# Patient Record
Sex: FEMALE | Race: WHITE | Employment: OTHER | URBAN - METROPOLITAN AREA
[De-identification: names, ages, dates, MRNs, and addresses within clinical notes are randomized per-mention and may not be internally consistent; named-entity substitution may affect disease eponyms.]

---

## 2017-05-12 ENCOUNTER — HOSPITAL ENCOUNTER (OUTPATIENT)
Age: 82
Setting detail: SPECIMEN
Discharge: HOME | End: 2017-05-12
Payer: COMMERCIAL

## 2017-05-12 LAB
T3 FREE: 2.47 PG/ML (ref 2.02–4.43)
THYROXINE, FREE: 1.45 NG/DL (ref 0.93–1.7)
TSH SERPL DL<=0.05 MIU/L-ACNC: 0.03 MIU/L (ref 0.3–5)

## 2017-05-12 PROCEDURE — 36415 COLL VENOUS BLD VENIPUNCTURE: CPT

## 2017-05-12 PROCEDURE — 84439 ASSAY OF FREE THYROXINE: CPT

## 2017-05-12 PROCEDURE — P9603 ONE-WAY ALLOW PRORATED MILES: HCPCS

## 2017-05-12 PROCEDURE — 84443 ASSAY THYROID STIM HORMONE: CPT

## 2017-05-12 PROCEDURE — 84481 FREE ASSAY (FT-3): CPT

## 2017-07-11 ENCOUNTER — HOSPITAL ENCOUNTER (OUTPATIENT)
Age: 82
Setting detail: SPECIMEN
Discharge: HOME | End: 2017-07-11
Payer: COMMERCIAL

## 2017-07-11 LAB
ANION GAP SERPL CALCULATED.3IONS-SCNC: 10 MMOL/L (ref 9–17)
BUN BLDV-MCNC: 9 MG/DL (ref 8–23)
BUN/CREAT BLD: ABNORMAL (ref 9–20)
CALCIUM SERPL-MCNC: 8.7 MG/DL (ref 8.6–10.4)
CHLORIDE BLD-SCNC: 102 MMOL/L (ref 98–107)
CO2: 30 MMOL/L (ref 20–31)
CREAT SERPL-MCNC: 0.5 MG/DL (ref 0.5–0.9)
GFR AFRICAN AMERICAN: >60 ML/MIN
GFR NON-AFRICAN AMERICAN: >60 ML/MIN
GFR SERPL CREATININE-BSD FRML MDRD: ABNORMAL ML/MIN/{1.73_M2}
GFR SERPL CREATININE-BSD FRML MDRD: ABNORMAL ML/MIN/{1.73_M2}
GLUCOSE BLD-MCNC: 61 MG/DL (ref 70–99)
POTASSIUM SERPL-SCNC: 4 MMOL/L (ref 3.7–5.3)
SODIUM BLD-SCNC: 142 MMOL/L (ref 135–144)

## 2017-07-11 PROCEDURE — P9603 ONE-WAY ALLOW PRORATED MILES: HCPCS

## 2017-07-11 PROCEDURE — 80048 BASIC METABOLIC PNL TOTAL CA: CPT

## 2017-07-11 PROCEDURE — 36415 COLL VENOUS BLD VENIPUNCTURE: CPT

## 2017-09-07 ENCOUNTER — HOSPITAL ENCOUNTER (OUTPATIENT)
Age: 82
Setting detail: SPECIMEN
Discharge: HOME | End: 2017-09-07
Payer: MEDICARE

## 2017-09-07 LAB
ANION GAP SERPL CALCULATED.3IONS-SCNC: 10 MMOL/L (ref 9–17)
BUN BLDV-MCNC: 10 MG/DL (ref 8–23)
BUN/CREAT BLD: 22 (ref 9–20)
CALCIUM SERPL-MCNC: 8.6 MG/DL (ref 8.6–10.4)
CHLORIDE BLD-SCNC: 102 MMOL/L (ref 98–107)
CO2: 29 MMOL/L (ref 20–31)
CREAT SERPL-MCNC: 0.46 MG/DL (ref 0.5–0.9)
GFR AFRICAN AMERICAN: >60 ML/MIN
GFR NON-AFRICAN AMERICAN: >60 ML/MIN
GFR SERPL CREATININE-BSD FRML MDRD: ABNORMAL ML/MIN/{1.73_M2}
GFR SERPL CREATININE-BSD FRML MDRD: ABNORMAL ML/MIN/{1.73_M2}
GLUCOSE BLD-MCNC: 98 MG/DL (ref 70–99)
POTASSIUM SERPL-SCNC: 4.6 MMOL/L (ref 3.7–5.3)
SODIUM BLD-SCNC: 141 MMOL/L (ref 135–144)

## 2017-09-07 PROCEDURE — P9603 ONE-WAY ALLOW PRORATED MILES: HCPCS

## 2017-09-07 PROCEDURE — 80048 BASIC METABOLIC PNL TOTAL CA: CPT

## 2017-09-07 PROCEDURE — 36415 COLL VENOUS BLD VENIPUNCTURE: CPT

## 2017-10-23 ENCOUNTER — HOSPITAL ENCOUNTER (OUTPATIENT)
Age: 82
Setting detail: SPECIMEN
Discharge: HOME | End: 2017-10-23
Payer: MEDICARE

## 2017-10-23 LAB
ANION GAP SERPL CALCULATED.3IONS-SCNC: 10 MMOL/L (ref 9–17)
BUN BLDV-MCNC: 13 MG/DL (ref 8–23)
BUN/CREAT BLD: 20 (ref 9–20)
CALCIUM SERPL-MCNC: 8.8 MG/DL (ref 8.6–10.4)
CHLORIDE BLD-SCNC: 99 MMOL/L (ref 98–107)
CO2: 29 MMOL/L (ref 20–31)
CREAT SERPL-MCNC: 0.64 MG/DL (ref 0.5–0.9)
GFR AFRICAN AMERICAN: >60 ML/MIN
GFR NON-AFRICAN AMERICAN: >60 ML/MIN
GFR SERPL CREATININE-BSD FRML MDRD: NORMAL ML/MIN/{1.73_M2}
GFR SERPL CREATININE-BSD FRML MDRD: NORMAL ML/MIN/{1.73_M2}
GLUCOSE BLD-MCNC: 93 MG/DL (ref 70–99)
HCT VFR BLD CALC: 40.6 % (ref 36–46)
HEMOGLOBIN: 13.2 G/DL (ref 12–16)
MCH RBC QN AUTO: 30.5 PG (ref 26–34)
MCHC RBC AUTO-ENTMCNC: 32.5 G/DL (ref 31–37)
MCV RBC AUTO: 93.8 FL (ref 80–100)
PDW BLD-RTO: 14.5 % (ref 11.5–14.5)
PLATELET # BLD: 257 K/UL (ref 130–400)
PMV BLD AUTO: 8.1 FL (ref 6–12)
POTASSIUM SERPL-SCNC: 4.4 MMOL/L (ref 3.7–5.3)
RBC # BLD: 4.33 M/UL (ref 4–5.2)
SODIUM BLD-SCNC: 138 MMOL/L (ref 135–144)
WBC # BLD: 6.1 K/UL (ref 3.5–11)

## 2017-10-23 PROCEDURE — P9603 ONE-WAY ALLOW PRORATED MILES: HCPCS

## 2017-10-23 PROCEDURE — 85027 COMPLETE CBC AUTOMATED: CPT

## 2017-10-23 PROCEDURE — 80048 BASIC METABOLIC PNL TOTAL CA: CPT

## 2017-10-23 PROCEDURE — 36415 COLL VENOUS BLD VENIPUNCTURE: CPT

## 2017-12-01 ENCOUNTER — HOSPITAL ENCOUNTER (OUTPATIENT)
Age: 82
Setting detail: SPECIMEN
Discharge: HOME | End: 2017-12-01
Payer: MEDICARE

## 2017-12-01 LAB
CHOLESTEROL/HDL RATIO: 3.2
CHOLESTEROL: 155 MG/DL
HDLC SERPL-MCNC: 49 MG/DL
LDL CHOLESTEROL: 94 MG/DL (ref 0–130)
TRIGL SERPL-MCNC: 62 MG/DL
VLDLC SERPL CALC-MCNC: NORMAL MG/DL (ref 1–30)

## 2017-12-01 PROCEDURE — 80061 LIPID PANEL: CPT

## 2017-12-01 PROCEDURE — P9603 ONE-WAY ALLOW PRORATED MILES: HCPCS

## 2017-12-01 PROCEDURE — 36415 COLL VENOUS BLD VENIPUNCTURE: CPT

## 2018-03-07 ENCOUNTER — HOSPITAL ENCOUNTER (OUTPATIENT)
Age: 83
Setting detail: SPECIMEN
Discharge: HOME | End: 2018-03-07
Payer: MEDICARE

## 2018-03-07 LAB
ANION GAP SERPL CALCULATED.3IONS-SCNC: 9 MMOL/L (ref 9–17)
BUN BLDV-MCNC: 14 MG/DL (ref 8–23)
BUN/CREAT BLD: ABNORMAL (ref 9–20)
CALCIUM SERPL-MCNC: 9.1 MG/DL (ref 8.6–10.4)
CHLORIDE BLD-SCNC: 107 MMOL/L (ref 98–107)
CO2: 30 MMOL/L (ref 20–31)
CREAT SERPL-MCNC: 0.51 MG/DL (ref 0.5–0.9)
GFR AFRICAN AMERICAN: >60 ML/MIN
GFR NON-AFRICAN AMERICAN: >60 ML/MIN
GFR SERPL CREATININE-BSD FRML MDRD: ABNORMAL ML/MIN/{1.73_M2}
GFR SERPL CREATININE-BSD FRML MDRD: ABNORMAL ML/MIN/{1.73_M2}
GLUCOSE BLD-MCNC: 70 MG/DL (ref 70–99)
POTASSIUM SERPL-SCNC: 4.3 MMOL/L (ref 3.7–5.3)
SODIUM BLD-SCNC: 146 MMOL/L (ref 135–144)

## 2018-03-07 PROCEDURE — 80048 BASIC METABOLIC PNL TOTAL CA: CPT

## 2018-03-07 PROCEDURE — P9603 ONE-WAY ALLOW PRORATED MILES: HCPCS

## 2018-03-07 PROCEDURE — 36415 COLL VENOUS BLD VENIPUNCTURE: CPT

## 2018-05-04 ENCOUNTER — HOSPITAL ENCOUNTER (OUTPATIENT)
Age: 83
Setting detail: SPECIMEN
Discharge: HOME | End: 2018-05-04
Payer: MEDICARE

## 2018-05-04 LAB
ANION GAP SERPL CALCULATED.3IONS-SCNC: 12 MMOL/L (ref 9–17)
BUN BLDV-MCNC: 16 MG/DL (ref 8–23)
BUN/CREAT BLD: ABNORMAL (ref 9–20)
CALCIUM SERPL-MCNC: 8 MG/DL (ref 8.6–10.4)
CHLORIDE BLD-SCNC: 106 MMOL/L (ref 98–107)
CO2: 29 MMOL/L (ref 20–31)
CREAT SERPL-MCNC: 0.43 MG/DL (ref 0.5–0.9)
GFR AFRICAN AMERICAN: >60 ML/MIN
GFR NON-AFRICAN AMERICAN: >60 ML/MIN
GFR SERPL CREATININE-BSD FRML MDRD: ABNORMAL ML/MIN/{1.73_M2}
GFR SERPL CREATININE-BSD FRML MDRD: ABNORMAL ML/MIN/{1.73_M2}
GLUCOSE BLD-MCNC: 74 MG/DL (ref 70–99)
HCT VFR BLD CALC: 41 % (ref 36.3–47.1)
HEMOGLOBIN: 12.6 G/DL (ref 11.9–15.1)
MCH RBC QN AUTO: 29.9 PG (ref 25.2–33.5)
MCHC RBC AUTO-ENTMCNC: 30.7 G/DL (ref 28.4–34.8)
MCV RBC AUTO: 97.4 FL (ref 82.6–102.9)
NRBC AUTOMATED: 0 PER 100 WBC
PDW BLD-RTO: 12.9 % (ref 11.8–14.4)
PLATELET # BLD: 284 K/UL (ref 138–453)
PMV BLD AUTO: 10.8 FL (ref 8.1–13.5)
POTASSIUM SERPL-SCNC: 3.8 MMOL/L (ref 3.7–5.3)
RBC # BLD: 4.21 M/UL (ref 3.95–5.11)
SODIUM BLD-SCNC: 147 MMOL/L (ref 135–144)
WBC # BLD: 6.4 K/UL (ref 3.5–11.3)

## 2018-05-04 PROCEDURE — 80048 BASIC METABOLIC PNL TOTAL CA: CPT

## 2018-05-04 PROCEDURE — 36415 COLL VENOUS BLD VENIPUNCTURE: CPT

## 2018-05-04 PROCEDURE — 85027 COMPLETE CBC AUTOMATED: CPT

## 2018-05-04 PROCEDURE — P9603 ONE-WAY ALLOW PRORATED MILES: HCPCS

## 2018-06-22 ENCOUNTER — HOSPITAL ENCOUNTER (OUTPATIENT)
Age: 83
Setting detail: SPECIMEN
Discharge: HOME | End: 2018-06-22
Payer: MEDICARE

## 2018-06-22 LAB
ANION GAP SERPL CALCULATED.3IONS-SCNC: 9 MMOL/L (ref 9–17)
BUN BLDV-MCNC: 14 MG/DL (ref 8–23)
BUN/CREAT BLD: ABNORMAL (ref 9–20)
CALCIUM SERPL-MCNC: 8.6 MG/DL (ref 8.6–10.4)
CHLORIDE BLD-SCNC: 104 MMOL/L (ref 98–107)
CO2: 29 MMOL/L (ref 20–31)
CREAT SERPL-MCNC: 0.51 MG/DL (ref 0.5–0.9)
GFR AFRICAN AMERICAN: >60 ML/MIN
GFR NON-AFRICAN AMERICAN: >60 ML/MIN
GFR SERPL CREATININE-BSD FRML MDRD: ABNORMAL ML/MIN/{1.73_M2}
GFR SERPL CREATININE-BSD FRML MDRD: ABNORMAL ML/MIN/{1.73_M2}
GLUCOSE BLD-MCNC: 64 MG/DL (ref 70–99)
HCT VFR BLD CALC: 43.7 % (ref 36.3–47.1)
HEMOGLOBIN: 13.2 G/DL (ref 11.9–15.1)
MCH RBC QN AUTO: 29.8 PG (ref 25.2–33.5)
MCHC RBC AUTO-ENTMCNC: 30.2 G/DL (ref 28.4–34.8)
MCV RBC AUTO: 98.6 FL (ref 82.6–102.9)
NRBC AUTOMATED: 0 PER 100 WBC
PDW BLD-RTO: 13.3 % (ref 11.8–14.4)
PLATELET # BLD: 210 K/UL (ref 138–453)
PMV BLD AUTO: 11.8 FL (ref 8.1–13.5)
POTASSIUM SERPL-SCNC: 4.1 MMOL/L (ref 3.7–5.3)
RBC # BLD: 4.43 M/UL (ref 3.95–5.11)
SODIUM BLD-SCNC: 142 MMOL/L (ref 135–144)
WBC # BLD: 6.5 K/UL (ref 3.5–11.3)

## 2018-06-22 PROCEDURE — 80048 BASIC METABOLIC PNL TOTAL CA: CPT

## 2018-06-22 PROCEDURE — P9603 ONE-WAY ALLOW PRORATED MILES: HCPCS

## 2018-06-22 PROCEDURE — 85027 COMPLETE CBC AUTOMATED: CPT

## 2018-06-22 PROCEDURE — 36415 COLL VENOUS BLD VENIPUNCTURE: CPT

## 2018-06-28 ENCOUNTER — HOSPITAL ENCOUNTER (OUTPATIENT)
Age: 83
Setting detail: SPECIMEN
Discharge: HOME | End: 2018-06-28
Payer: MEDICARE

## 2018-06-28 LAB
ANION GAP SERPL CALCULATED.3IONS-SCNC: 11 MMOL/L (ref 9–17)
BUN BLDV-MCNC: 16 MG/DL (ref 8–23)
BUN/CREAT BLD: NORMAL (ref 9–20)
CALCIUM SERPL-MCNC: 8.6 MG/DL (ref 8.6–10.4)
CHLORIDE BLD-SCNC: 107 MMOL/L (ref 98–107)
CO2: 26 MMOL/L (ref 20–31)
CREAT SERPL-MCNC: 0.65 MG/DL (ref 0.5–0.9)
GFR AFRICAN AMERICAN: >60 ML/MIN
GFR NON-AFRICAN AMERICAN: >60 ML/MIN
GFR SERPL CREATININE-BSD FRML MDRD: NORMAL ML/MIN/{1.73_M2}
GFR SERPL CREATININE-BSD FRML MDRD: NORMAL ML/MIN/{1.73_M2}
GLUCOSE BLD-MCNC: 81 MG/DL (ref 70–99)
POTASSIUM SERPL-SCNC: 4 MMOL/L (ref 3.7–5.3)
SODIUM BLD-SCNC: 144 MMOL/L (ref 135–144)
TSH SERPL DL<=0.05 MIU/L-ACNC: 0.02 MIU/L (ref 0.3–5)

## 2018-06-28 PROCEDURE — P9603 ONE-WAY ALLOW PRORATED MILES: HCPCS

## 2018-06-28 PROCEDURE — 80048 BASIC METABOLIC PNL TOTAL CA: CPT

## 2018-06-28 PROCEDURE — 36415 COLL VENOUS BLD VENIPUNCTURE: CPT

## 2018-06-28 PROCEDURE — 84443 ASSAY THYROID STIM HORMONE: CPT

## 2018-07-06 ENCOUNTER — HOSPITAL ENCOUNTER (OUTPATIENT)
Age: 83
Setting detail: SPECIMEN
Discharge: HOME | End: 2018-07-06
Payer: MEDICARE

## 2018-07-06 LAB — TSH SERPL DL<=0.05 MIU/L-ACNC: 0.03 MIU/L (ref 0.3–5)

## 2018-07-06 PROCEDURE — P9603 ONE-WAY ALLOW PRORATED MILES: HCPCS

## 2018-07-06 PROCEDURE — 84443 ASSAY THYROID STIM HORMONE: CPT

## 2018-07-06 PROCEDURE — 36415 COLL VENOUS BLD VENIPUNCTURE: CPT

## 2018-07-26 ENCOUNTER — HOSPITAL ENCOUNTER (OUTPATIENT)
Age: 83
Setting detail: SPECIMEN
Discharge: HOME | End: 2018-07-26
Payer: MEDICARE

## 2018-07-26 LAB
ANION GAP SERPL CALCULATED.3IONS-SCNC: 12 MMOL/L (ref 9–17)
BUN BLDV-MCNC: 16 MG/DL (ref 8–23)
BUN/CREAT BLD: ABNORMAL (ref 9–20)
CALCIUM SERPL-MCNC: 8.4 MG/DL (ref 8.6–10.4)
CHLORIDE BLD-SCNC: 108 MMOL/L (ref 98–107)
CO2: 26 MMOL/L (ref 20–31)
CREAT SERPL-MCNC: 0.71 MG/DL (ref 0.5–0.9)
GFR AFRICAN AMERICAN: >60 ML/MIN
GFR NON-AFRICAN AMERICAN: >60 ML/MIN
GFR SERPL CREATININE-BSD FRML MDRD: ABNORMAL ML/MIN/{1.73_M2}
GFR SERPL CREATININE-BSD FRML MDRD: ABNORMAL ML/MIN/{1.73_M2}
GLUCOSE BLD-MCNC: 57 MG/DL (ref 70–99)
HCT VFR BLD CALC: 39.7 % (ref 36.3–47.1)
HEMOGLOBIN: 11.7 G/DL (ref 11.9–15.1)
KEPPRA: 9 UG/ML
MCH RBC QN AUTO: 29.3 PG (ref 25.2–33.5)
MCHC RBC AUTO-ENTMCNC: 29.5 G/DL (ref 28.4–34.8)
MCV RBC AUTO: 99.3 FL (ref 82.6–102.9)
NRBC AUTOMATED: 0 PER 100 WBC
PDW BLD-RTO: 13.6 % (ref 11.8–14.4)
PLATELET # BLD: 224 K/UL (ref 138–453)
PMV BLD AUTO: 11.4 FL (ref 8.1–13.5)
POTASSIUM SERPL-SCNC: 3.9 MMOL/L (ref 3.7–5.3)
RBC # BLD: 4 M/UL (ref 3.95–5.11)
SODIUM BLD-SCNC: 146 MMOL/L (ref 135–144)
WBC # BLD: 6 K/UL (ref 3.5–11.3)

## 2018-07-26 PROCEDURE — 85027 COMPLETE CBC AUTOMATED: CPT

## 2018-07-26 PROCEDURE — 80177 DRUG SCRN QUAN LEVETIRACETAM: CPT

## 2018-07-26 PROCEDURE — 36415 COLL VENOUS BLD VENIPUNCTURE: CPT

## 2018-07-26 PROCEDURE — P9603 ONE-WAY ALLOW PRORATED MILES: HCPCS

## 2018-07-26 PROCEDURE — 80048 BASIC METABOLIC PNL TOTAL CA: CPT

## 2018-08-08 ENCOUNTER — HOSPITAL ENCOUNTER (OUTPATIENT)
Age: 83
Setting detail: SPECIMEN
Discharge: HOME | End: 2018-08-08
Payer: MEDICARE

## 2018-08-08 LAB — TSH SERPL DL<=0.05 MIU/L-ACNC: 1.49 MIU/L (ref 0.3–5)

## 2018-08-08 PROCEDURE — 36415 COLL VENOUS BLD VENIPUNCTURE: CPT

## 2018-08-08 PROCEDURE — P9603 ONE-WAY ALLOW PRORATED MILES: HCPCS

## 2018-08-08 PROCEDURE — 84443 ASSAY THYROID STIM HORMONE: CPT

## 2018-10-15 ENCOUNTER — HOSPITAL ENCOUNTER (EMERGENCY)
Age: 83
Discharge: ANOTHER HOSPITAL | End: 2018-10-15
Attending: EMERGENCY MEDICINE
Payer: MEDICARE

## 2018-10-15 ENCOUNTER — APPOINTMENT (OUTPATIENT)
Dept: GENERAL RADIOLOGY | Age: 83
End: 2018-10-15
Payer: MEDICARE

## 2018-10-15 ENCOUNTER — APPOINTMENT (OUTPATIENT)
Dept: GENERAL RADIOLOGY | Age: 83
DRG: 092 | End: 2018-10-15
Payer: MEDICARE

## 2018-10-15 ENCOUNTER — HOSPITAL ENCOUNTER (EMERGENCY)
Age: 83
Discharge: ANOTHER HOSPITAL | DRG: 092 | End: 2018-10-15
Payer: MEDICARE

## 2018-10-15 ENCOUNTER — APPOINTMENT (OUTPATIENT)
Dept: CT IMAGING | Age: 83
DRG: 092 | End: 2018-10-15
Payer: MEDICARE

## 2018-10-15 ENCOUNTER — HOSPITAL ENCOUNTER (INPATIENT)
Age: 83
LOS: 3 days | Discharge: NURSING, REHAB OR LONG-TERM CARE | DRG: 092 | End: 2018-10-18
Attending: EMERGENCY MEDICINE | Admitting: NEUROLOGICAL SURGERY
Payer: MEDICARE

## 2018-10-15 VITALS
OXYGEN SATURATION: 99 % | BODY MASS INDEX: 24.8 KG/M2 | HEART RATE: 58 BPM | SYSTOLIC BLOOD PRESSURE: 111 MMHG | DIASTOLIC BLOOD PRESSURE: 60 MMHG | TEMPERATURE: 96.7 F | WEIGHT: 140 LBS | RESPIRATION RATE: 14 BRPM

## 2018-10-15 DIAGNOSIS — G96.08 SUBDURAL HYGROMA: Primary | ICD-10-CM

## 2018-10-15 DIAGNOSIS — G81.94 ACUTE LEFT HEMIPARESIS (HCC): Primary | ICD-10-CM

## 2018-10-15 PROBLEM — S06.5XAA SUBDURAL HEMATOMA: Status: ACTIVE | Noted: 2018-10-15

## 2018-10-15 LAB
% CKMB: 3 % (ref 0–3)
ABO/RH: NORMAL
ABSOLUTE EOS #: 0.08 K/UL (ref 0–0.44)
ABSOLUTE EOS #: 0.1 K/UL (ref 0–0.4)
ABSOLUTE IMMATURE GRANULOCYTE: 0.05 K/UL (ref 0–0.3)
ABSOLUTE IMMATURE GRANULOCYTE: ABNORMAL K/UL (ref 0–0.3)
ABSOLUTE LYMPH #: 0.9 K/UL (ref 1–4.8)
ABSOLUTE LYMPH #: 1.14 K/UL (ref 1.1–3.7)
ABSOLUTE MONO #: 0.6 K/UL (ref 0.1–1.3)
ABSOLUTE MONO #: 0.61 K/UL (ref 0.1–1.2)
ANION GAP SERPL CALCULATED.3IONS-SCNC: 11 MMOL/L (ref 9–17)
ANION GAP SERPL CALCULATED.3IONS-SCNC: 9 MMOL/L (ref 9–17)
ANTIBODY SCREEN: NEGATIVE
ARM BAND NUMBER: NORMAL
BASOPHILS # BLD: 0 % (ref 0–2)
BASOPHILS # BLD: 1 % (ref 0–2)
BASOPHILS ABSOLUTE: 0 K/UL (ref 0–0.2)
BASOPHILS ABSOLUTE: 0.03 K/UL (ref 0–0.2)
BUN BLDV-MCNC: 9 MG/DL (ref 8–23)
BUN BLDV-MCNC: 9 MG/DL (ref 8–23)
BUN/CREAT BLD: ABNORMAL (ref 9–20)
BUN/CREAT BLD: ABNORMAL (ref 9–20)
CALCIUM SERPL-MCNC: 8.8 MG/DL (ref 8.6–10.4)
CALCIUM SERPL-MCNC: 9.3 MG/DL (ref 8.6–10.4)
CHLORIDE BLD-SCNC: 102 MMOL/L (ref 98–107)
CHLORIDE BLD-SCNC: 103 MMOL/L (ref 98–107)
CHP ED QC CHECK: NORMAL
CK MB: <1 NG/ML
CKMB INTERPRETATION: ABNORMAL
CO2: 27 MMOL/L (ref 20–31)
CO2: 30 MMOL/L (ref 20–31)
CREAT SERPL-MCNC: 0.72 MG/DL (ref 0.5–0.9)
CREAT SERPL-MCNC: 0.74 MG/DL (ref 0.5–0.9)
DIFFERENTIAL TYPE: ABNORMAL
DIFFERENTIAL TYPE: ABNORMAL
EKG ATRIAL RATE: 54 BPM
EKG ATRIAL RATE: 63 BPM
EKG P AXIS: 55 DEGREES
EKG P AXIS: 56 DEGREES
EKG P-R INTERVAL: 128 MS
EKG P-R INTERVAL: 142 MS
EKG Q-T INTERVAL: 420 MS
EKG Q-T INTERVAL: 440 MS
EKG QRS DURATION: 86 MS
EKG QRS DURATION: 88 MS
EKG QTC CALCULATION (BAZETT): 417 MS
EKG QTC CALCULATION (BAZETT): 429 MS
EKG R AXIS: -28 DEGREES
EKG R AXIS: -36 DEGREES
EKG T AXIS: -27 DEGREES
EKG T AXIS: 14 DEGREES
EKG VENTRICULAR RATE: 54 BPM
EKG VENTRICULAR RATE: 63 BPM
EOSINOPHILS RELATIVE PERCENT: 1 % (ref 1–4)
EOSINOPHILS RELATIVE PERCENT: 2 % (ref 0–4)
EXPIRATION DATE: NORMAL
GFR AFRICAN AMERICAN: >60 ML/MIN
GFR AFRICAN AMERICAN: >60 ML/MIN
GFR NON-AFRICAN AMERICAN: >60 ML/MIN
GFR NON-AFRICAN AMERICAN: >60 ML/MIN
GFR SERPL CREATININE-BSD FRML MDRD: ABNORMAL ML/MIN/{1.73_M2}
GLUCOSE BLD-MCNC: 110 MG/DL (ref 65–105)
GLUCOSE BLD-MCNC: 111 MG/DL
GLUCOSE BLD-MCNC: 111 MG/DL (ref 70–99)
GLUCOSE BLD-MCNC: 99 MG/DL (ref 70–99)
HCT VFR BLD CALC: 40.2 % (ref 36.3–47.1)
HCT VFR BLD CALC: 40.3 % (ref 36–46)
HEMOGLOBIN: 12.3 G/DL (ref 11.9–15.1)
HEMOGLOBIN: 13.1 G/DL (ref 12–16)
IMMATURE GRANULOCYTES: 1 %
IMMATURE GRANULOCYTES: ABNORMAL %
INR BLD: 1.1
INR BLD: 1.3
LYMPHOCYTES # BLD: 12 % (ref 24–43)
LYMPHOCYTES # BLD: 9 % (ref 24–44)
MCH RBC QN AUTO: 30.7 PG (ref 25.2–33.5)
MCH RBC QN AUTO: 31.5 PG (ref 26–34)
MCHC RBC AUTO-ENTMCNC: 30.6 G/DL (ref 28.4–34.8)
MCHC RBC AUTO-ENTMCNC: 32.6 G/DL (ref 31–37)
MCV RBC AUTO: 100.2 FL (ref 82.6–102.9)
MCV RBC AUTO: 96.7 FL (ref 80–100)
MONOCYTES # BLD: 6 % (ref 3–12)
MONOCYTES # BLD: 7 % (ref 1–7)
MYOGLOBIN: 33 NG/ML (ref 25–58)
NRBC AUTOMATED: 0 PER 100 WBC
NRBC AUTOMATED: ABNORMAL PER 100 WBC
PARTIAL THROMBOPLASTIN TIME: 32.6 SEC (ref 20.5–30.5)
PARTIAL THROMBOPLASTIN TIME: 41.1 SEC (ref 23–31)
PDW BLD-RTO: 13.2 % (ref 11.8–14.4)
PDW BLD-RTO: 14.2 % (ref 11.5–14.9)
PLATELET # BLD: 213 K/UL (ref 138–453)
PLATELET # BLD: 257 K/UL (ref 150–450)
PLATELET ESTIMATE: ABNORMAL
PLATELET ESTIMATE: ABNORMAL
PMV BLD AUTO: 7.7 FL (ref 6–12)
PMV BLD AUTO: 9.8 FL (ref 8.1–13.5)
POTASSIUM SERPL-SCNC: 4.5 MMOL/L (ref 3.7–5.3)
POTASSIUM SERPL-SCNC: 4.8 MMOL/L (ref 3.7–5.3)
PROTHROMBIN TIME: 11.8 SEC (ref 9–12)
PROTHROMBIN TIME: 13.7 SEC (ref 9.7–12)
RBC # BLD: 4.01 M/UL (ref 3.95–5.11)
RBC # BLD: 4.17 M/UL (ref 4–5.2)
RBC # BLD: ABNORMAL 10*6/UL
RBC # BLD: ABNORMAL 10*6/UL
SEG NEUTROPHILS: 80 % (ref 36–65)
SEG NEUTROPHILS: 81 % (ref 36–66)
SEGMENTED NEUTROPHILS ABSOLUTE COUNT: 7.5 K/UL (ref 1.3–9.1)
SEGMENTED NEUTROPHILS ABSOLUTE COUNT: 7.76 K/UL (ref 1.5–8.1)
SODIUM BLD-SCNC: 141 MMOL/L (ref 135–144)
SODIUM BLD-SCNC: 141 MMOL/L (ref 135–144)
TOTAL CK: 33 U/L (ref 26–192)
TROPONIN INTERP: ABNORMAL
TROPONIN INTERP: NORMAL
TROPONIN T: <0.03 NG/ML
TROPONIN T: <0.03 NG/ML
WBC # BLD: 9.2 K/UL (ref 3.5–11)
WBC # BLD: 9.7 K/UL (ref 3.5–11.3)
WBC # BLD: ABNORMAL 10*3/UL
WBC # BLD: ABNORMAL 10*3/UL

## 2018-10-15 PROCEDURE — 86901 BLOOD TYPING SEROLOGIC RH(D): CPT

## 2018-10-15 PROCEDURE — 36415 COLL VENOUS BLD VENIPUNCTURE: CPT

## 2018-10-15 PROCEDURE — 96365 THER/PROPH/DIAG IV INF INIT: CPT

## 2018-10-15 PROCEDURE — 6360000002 HC RX W HCPCS: Performed by: EMERGENCY MEDICINE

## 2018-10-15 PROCEDURE — 71045 X-RAY EXAM CHEST 1 VIEW: CPT

## 2018-10-15 PROCEDURE — 85025 COMPLETE CBC W/AUTO DIFF WBC: CPT

## 2018-10-15 PROCEDURE — 99291 CRITICAL CARE FIRST HOUR: CPT | Performed by: NEUROLOGICAL SURGERY

## 2018-10-15 PROCEDURE — 85610 PROTHROMBIN TIME: CPT

## 2018-10-15 PROCEDURE — 80048 BASIC METABOLIC PNL TOTAL CA: CPT

## 2018-10-15 PROCEDURE — 93005 ELECTROCARDIOGRAM TRACING: CPT

## 2018-10-15 PROCEDURE — 96374 THER/PROPH/DIAG INJ IV PUSH: CPT

## 2018-10-15 PROCEDURE — 82550 ASSAY OF CK (CPK): CPT

## 2018-10-15 PROCEDURE — 70498 CT ANGIOGRAPHY NECK: CPT

## 2018-10-15 PROCEDURE — 83874 ASSAY OF MYOGLOBIN: CPT

## 2018-10-15 PROCEDURE — G0384 LEV 5 HOSP TYPE B ED VISIT: HCPCS

## 2018-10-15 PROCEDURE — 82947 ASSAY GLUCOSE BLOOD QUANT: CPT

## 2018-10-15 PROCEDURE — 85730 THROMBOPLASTIN TIME PARTIAL: CPT

## 2018-10-15 PROCEDURE — 70496 CT ANGIOGRAPHY HEAD: CPT

## 2018-10-15 PROCEDURE — 6360000004 HC RX CONTRAST MEDICATION: Performed by: EMERGENCY MEDICINE

## 2018-10-15 PROCEDURE — 84484 ASSAY OF TROPONIN QUANT: CPT

## 2018-10-15 PROCEDURE — 99285 EMERGENCY DEPT VISIT HI MDM: CPT

## 2018-10-15 PROCEDURE — 6360000002 HC RX W HCPCS

## 2018-10-15 PROCEDURE — 2000000003 HC NEURO ICU R&B

## 2018-10-15 PROCEDURE — 70450 CT HEAD/BRAIN W/O DYE: CPT

## 2018-10-15 PROCEDURE — 2580000003 HC RX 258: Performed by: EMERGENCY MEDICINE

## 2018-10-15 PROCEDURE — 82553 CREATINE MB FRACTION: CPT

## 2018-10-15 PROCEDURE — 86850 RBC ANTIBODY SCREEN: CPT

## 2018-10-15 PROCEDURE — 86900 BLOOD TYPING SEROLOGIC ABO: CPT

## 2018-10-15 PROCEDURE — 2580000003 HC RX 258: Performed by: NURSE PRACTITIONER

## 2018-10-15 PROCEDURE — 99222 1ST HOSP IP/OBS MODERATE 55: CPT | Performed by: PSYCHIATRY & NEUROLOGY

## 2018-10-15 RX ORDER — ZINC OXIDE AND DIMETHICONE 120; 10 MG/G; MG/G
CREAM TOPICAL 2 TIMES DAILY
COMMUNITY

## 2018-10-15 RX ORDER — SODIUM CHLORIDE 0.9 % (FLUSH) 0.9 %
10 SYRINGE (ML) INJECTION EVERY 12 HOURS SCHEDULED
Status: DISCONTINUED | OUTPATIENT
Start: 2018-10-15 | End: 2018-10-18 | Stop reason: HOSPADM

## 2018-10-15 RX ORDER — LABETALOL HYDROCHLORIDE 5 MG/ML
10 INJECTION, SOLUTION INTRAVENOUS EVERY 4 HOURS PRN
Status: DISCONTINUED | OUTPATIENT
Start: 2018-10-15 | End: 2018-10-18 | Stop reason: HOSPADM

## 2018-10-15 RX ORDER — LORAZEPAM 2 MG/ML
INJECTION INTRAMUSCULAR
Status: COMPLETED
Start: 2018-10-15 | End: 2018-10-15

## 2018-10-15 RX ORDER — POLYETHYLENE GLYCOL 3350 17 G/17G
17 POWDER, FOR SOLUTION ORAL DAILY
Status: DISCONTINUED | OUTPATIENT
Start: 2018-10-16 | End: 2018-10-15 | Stop reason: SDUPTHER

## 2018-10-15 RX ORDER — SODIUM CHLORIDE 0.9 % (FLUSH) 0.9 %
10 SYRINGE (ML) INJECTION PRN
Status: DISCONTINUED | OUTPATIENT
Start: 2018-10-15 | End: 2018-10-18 | Stop reason: HOSPADM

## 2018-10-15 RX ORDER — ONDANSETRON 2 MG/ML
4 INJECTION INTRAMUSCULAR; INTRAVENOUS EVERY 6 HOURS PRN
Status: DISCONTINUED | OUTPATIENT
Start: 2018-10-15 | End: 2018-10-18 | Stop reason: HOSPADM

## 2018-10-15 RX ORDER — SENNA AND DOCUSATE SODIUM 50; 8.6 MG/1; MG/1
2 TABLET, FILM COATED ORAL DAILY
Status: DISCONTINUED | OUTPATIENT
Start: 2018-10-16 | End: 2018-10-18 | Stop reason: HOSPADM

## 2018-10-15 RX ORDER — POLYETHYLENE GLYCOL 3350 17 G/17G
17 POWDER, FOR SOLUTION ORAL NIGHTLY
Status: DISCONTINUED | OUTPATIENT
Start: 2018-10-15 | End: 2018-10-16

## 2018-10-15 RX ORDER — LORAZEPAM 2 MG/ML
1 INJECTION INTRAMUSCULAR ONCE
Status: COMPLETED | OUTPATIENT
Start: 2018-10-15 | End: 2018-10-15

## 2018-10-15 RX ORDER — MEMANTINE HYDROCHLORIDE 10 MG/1
10 TABLET ORAL 2 TIMES DAILY
COMMUNITY

## 2018-10-15 RX ORDER — ACETAMINOPHEN 325 MG/1
650 TABLET ORAL EVERY 6 HOURS PRN
COMMUNITY

## 2018-10-15 RX ORDER — DOCUSATE SODIUM 100 MG/1
100 CAPSULE, LIQUID FILLED ORAL 2 TIMES DAILY
Status: DISCONTINUED | OUTPATIENT
Start: 2018-10-15 | End: 2018-10-18 | Stop reason: HOSPADM

## 2018-10-15 RX ORDER — MEMANTINE HYDROCHLORIDE 5 MG/1
10 TABLET ORAL 2 TIMES DAILY
Status: DISCONTINUED | OUTPATIENT
Start: 2018-10-15 | End: 2018-10-18 | Stop reason: HOSPADM

## 2018-10-15 RX ORDER — SODIUM CHLORIDE 9 MG/ML
INJECTION, SOLUTION INTRAVENOUS CONTINUOUS
Status: DISCONTINUED | OUTPATIENT
Start: 2018-10-15 | End: 2018-10-16

## 2018-10-15 RX ORDER — LEVOTHYROXINE SODIUM 0.12 MG/1
125 TABLET ORAL EVERY MORNING
Status: DISCONTINUED | OUTPATIENT
Start: 2018-10-16 | End: 2018-10-18 | Stop reason: HOSPADM

## 2018-10-15 RX ORDER — ACETAMINOPHEN 325 MG/1
650 TABLET ORAL EVERY 4 HOURS PRN
Status: DISCONTINUED | OUTPATIENT
Start: 2018-10-15 | End: 2018-10-18 | Stop reason: HOSPADM

## 2018-10-15 RX ORDER — MIRTAZAPINE 7.5 MG/1
7.5 TABLET, FILM COATED ORAL NIGHTLY
COMMUNITY
End: 2019-07-08 | Stop reason: ALTCHOICE

## 2018-10-15 RX ORDER — IPRATROPIUM BROMIDE AND ALBUTEROL SULFATE 2.5; .5 MG/3ML; MG/3ML
1 SOLUTION RESPIRATORY (INHALATION) EVERY 4 HOURS PRN
COMMUNITY

## 2018-10-15 RX ORDER — DONEPEZIL HYDROCHLORIDE 10 MG/1
10 TABLET, FILM COATED ORAL NIGHTLY
Status: DISCONTINUED | OUTPATIENT
Start: 2018-10-15 | End: 2018-10-18 | Stop reason: HOSPADM

## 2018-10-15 RX ORDER — LEVETIRACETAM 500 MG/1
500 TABLET ORAL 2 TIMES DAILY
Status: DISCONTINUED | OUTPATIENT
Start: 2018-10-15 | End: 2018-10-16

## 2018-10-15 RX ORDER — RISPERIDONE 0.25 MG/1
0.25 TABLET, FILM COATED ORAL DAILY
Status: DISCONTINUED | OUTPATIENT
Start: 2018-10-16 | End: 2018-10-18 | Stop reason: HOSPADM

## 2018-10-15 RX ORDER — IPRATROPIUM BROMIDE AND ALBUTEROL SULFATE 2.5; .5 MG/3ML; MG/3ML
1 SOLUTION RESPIRATORY (INHALATION) EVERY 4 HOURS PRN
Status: DISCONTINUED | OUTPATIENT
Start: 2018-10-15 | End: 2018-10-16

## 2018-10-15 RX ADMIN — LORAZEPAM 1 MG: 2 INJECTION INTRAMUSCULAR at 17:17

## 2018-10-15 RX ADMIN — IOPAMIDOL 90 ML: 755 INJECTION, SOLUTION INTRAVENOUS at 17:19

## 2018-10-15 RX ADMIN — LEVETIRACETAM 500 MG: 100 INJECTION, SOLUTION INTRAVENOUS at 15:39

## 2018-10-15 RX ADMIN — SODIUM CHLORIDE: 9 INJECTION, SOLUTION INTRAVENOUS at 22:48

## 2018-10-15 ASSESSMENT — ENCOUNTER SYMPTOMS
COLOR CHANGE: 0
DIARRHEA: 0
EYE DISCHARGE: 0
EYE REDNESS: 0
CHEST TIGHTNESS: 0
VOMITING: 0
CONSTIPATION: 0
RHINORRHEA: 0
WHEEZING: 0
ABDOMINAL PAIN: 0
SINUS PRESSURE: 0
BLOOD IN STOOL: 0
BACK PAIN: 0
SORE THROAT: 0
COUGH: 0
FACIAL SWELLING: 0
TROUBLE SWALLOWING: 0
EYE PAIN: 0
NAUSEA: 0
SHORTNESS OF BREATH: 0

## 2018-10-15 NOTE — ED NOTES
Take 20 mg by mouth daily Indications: **Give with a meal for absorption** **Give with a meal for absorption**    Historical Provider, MD   tiotropium (SPIRIVA) 18 MCG inhalation capsule Inhale 18 mcg into the lungs every morning     Historical Provider, MD   budesonide-formoterol (SYMBICORT) 80-4.5 MCG/ACT AERO Inhale 2 puffs into the lungs 2 times daily    Historical Provider, MD   omeprazole (PRILOSEC) 20 MG capsule Take 20 mg by mouth every morning     Historical Provider, MD   calcium carbonate (OSCAL) 500 MG TABS tablet Take 500 mg by mouth 2 times daily    Historical Provider, MD   risperiDONE (RISPERDAL) 0.5 MG tablet Take 0.5 mg by mouth nightly     Historical Provider, MD   isosorbide dinitrate (ISORDIL) 20 MG tablet Take 20 mg by mouth 2 times daily Indications: 1100 and 2000 to ensure adequate \"nitrate-free period\" 1100 and 2000 to ensure adequate \"nitrate-free period\"    Historical Provider, MD   levETIRAcetam (KEPPRA) 500 MG tablet Take 500 mg by mouth 2 times daily    Historical Provider, MD   guaiFENesin 400 MG tablet Take 400 mg by mouth 2 times daily as needed for Cough    Historical Provider, MD   Multiple Vitamins-Minerals (THERAPEUTIC MULTIVITAMIN-MINERALS) tablet Take 1 tablet by mouth daily    Historical Provider, MD   Ascorbic Acid (VITAMIN C) 250 MG tablet Take 250 mg by mouth 2 times daily    Historical Provider, MD   aspirin 81 MG tablet Take 81 mg by mouth every morning     Historical Provider, MD   atorvastatin (LIPITOR) 20 MG tablet Take 20 mg by mouth nightly     Historical Provider, MD    Scheduled Meds:  Continuous Infusions:  PRN Meds:.  Past Medical History  [] Updated/Reviewed by MSU  [x] Historical Data Only  [] Unavailable   has a past medical history of Anemia; Colon cancer ([facility] [facility] COPD (chronic obstructive pulmonary disease) ([facility] Dementia; Gastric reflux; Hyperlipidemia; Hypothyroid; and Pneumonia.     Patient Weight: 153 lbs  []   Estimated   [x]   Stated          Vitals and monitored throughout encounter. HOB maintained at 30 degrees. Patient was transferred to ambulance, cot secured in scan position. Non contrast CT scan performed. After scan cot secured in transport position. IV tPA inclusion/exclusion criteria reviewed with patient and physician. Candidate for IV tPA therapy? [] Yes   [x] No - due to the following exclusion criteria: Pt is on Xarelto  Patient is being transported to [address] [facility]  During transport patient rests comfortably. Cabin temp maintained at 70-72 °F throughout transport. Patient was transferred to bed ER# 8 via 4 person sheet lift. . Patient care handoff completed with Marcello Fuentes RN at bedside. Dr Makayla Lopez at bedside      Imaging:  Images were obtained onboard the MSU including:  [x] CT brain without contrast - see results below:      Ct Head Wo Contrast    Result Date: 10/15/2018  EXAMINATION: CT OF THE HEAD WITHOUT CONTRAST  10/15/2018 12:47 pm TECHNIQUE: CT of the head was performed without the administration of intravenous contrast. Dose modulation, iterative reconstruction, and/or weight based adjustment of the mA/kV was utilized to reduce the radiation dose to as low as reasonably achievable. COMPARISON: CT head November 4, 2015 HISTORY: ORDERING SYSTEM PROVIDED HISTORY: STROKE TECHNOLOGIST PROVIDED HISTORY: FINDINGS: Limited due to motion artifacts. BRAIN/VENTRICLES: The patient is status post right frontotemporal craniotomy/cranioplasty, with a stable large encephalomalacia in the right frontal parietal temporal lobe containing scattered coarse calcification, stable. There is question of a right cerebral convexity chronic subdural hematoma or age indeterminate subdural hygroma measuring up to 7 mm in maximal thickness (series 1, image 17). There is 3.4 mm right to left midline shift, grossly stable since the prior study. There is mild ventriculomegaly, grossly stable. No evidence of hydrocephalus.  There is mild parenchymal volume loss. There is periventricular white matter low attenuation, likely related to mild chronic microvascular disease. There is no evidence of acute territorial infarction or acute intracranial hemorrhage. ORBITS: The visualized portion of the orbits demonstrate no acute abnormality. SINUSES: The visualized paranasal sinuses and mastoid air cells demonstrate no acute abnormality. SOFT TISSUES/SKULL:  No acute abnormality of the visualized skull or soft tissues. Motion artifacts. Question of a right cerebral convexity chronic subdural hematoma or age-indeterminate subdural hygroma measuring up to 7 mm in maximal thickness, new since November 4, 2015. 3.4 mm right to left midline shift, grossly stable since the prior study. Mild parenchymal volume loss. Mild chronic microvascular disease. The results were reported to Dr. Claudia Thibodeaux at 12:57 p.m. on October 15, 2018. Physical assessment performed:    Neuro: Pt is alert confused. Pt has left facial droop, left sided weakness which is worsened from baseline. No deficits noted on the right side. Resp: lungs clear to auscultation bilaterally, normal effort  Cardiac: regular rate, no murmur, 12 lead ECG shows Afib  Muscular/skeletal/peripheral vascular: no edema, no redness or tenderness in the calves, pulses present in 4 extremities   Abd/GI/: abd flat, soft, non tender, bowel sounds present x 4 quadrants   Skin: normal color, warm, dry      IV Lines:  Time Type Site/Route Size # Attempts Performed By   NA                                  Total Amount of IV Fluids Infused: NA    Meds / Electrical rx   Time Therapy Dosage Route Response Preformed By   NA                                                    TPA Administration   Time Bolus Dose Infusion Dose   NA              [] tPA dosing double checked by:       Acute stroke dysphagia screen              YES NO   1 GCS less than 13?         2 Facial Asymmetry or Weakness? 3 Tongue Asymmetry or Weakness?

## 2018-10-15 NOTE — ED NOTES
Pt resting on stretcher, no respiratory distress noted, pt updated on plan of care, will continue to monitor, call light in reach.        Shahid Aadn  10/15/18 7726
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
## 2018-10-15 NOTE — ED NOTES
Pt resting on stretcher, no respiratory distress noted, pt updated on plan of care, will continue to monitor, call light in reach.        Shane Adan  10/15/18 8252

## 2018-10-15 NOTE — ED PROVIDER NOTES
I performed a history and physical examination of the patient and discussed management with the resident. I reviewed the residents note and agree with the documented findings and plan of care. Any areas of disagreement are noted on the chart. I was personally present for the key portions of any procedures. I have documented in the chart those procedures where I was not present during the key portions. I have reviewed the emergency nurses triage note. I agree with the chief complaint, past medical history, past surgical history, allergies, medications, social and family history as documented unless otherwise noted below. Documentation of the HPI, Physical Exam and Medical Decision Making performed by medical students or scribes is based on my personal performance of the HPI, PE and MDM. For Phys Assistant/ Nurse Practitioner cases/documentation I have personally evaluated this patient and have completed at least one if not all key elements of the E/M (history, physical exam, and MDM). I find the patient's history and physical exam are consistent with the NP/PA documentation. I agree with the care provided, treatment rendered, disposition and followup plan. Additional findings are as noted. Jn Wilkerson. Nicholas Dash MD  Attending Emergency  Physician    [address] [facility] WORSENING SUBDURAL HYGROMA OR CHRONIC SDH. HX OF CVA WITH RESIDUAL LEFT HEMIPARESIS, CRANIOTOMY WITH SHUNT? NOW WORSE? NO FALL OR OBVIOUS TRAUMA RECALLED OR REPORTED. DENIES HEADACHE, OR RECENT TRAUMA/REINJURY. NO NECK/BACK/CHEST/ABD PAIN. ? WORSENING LEFT SIDED WEAKNESS? AWAKE, ALERT, COOP, RESPONSIVE. SCALP-ATRAUMATIC, NONTENDER. PERRL, EOMI. SPEECH FLUENT, NORMAL COMPREHENSION. ? MILDLY CONFUSED. ORIENTED TO PERSON, PLACE, MONTH. POS LEFT HEMIPARESIS. LUNGS CLEAR KELSIE. CARDIAC-S1S2, RRR, NO MRG. ABD SOFT, NONDISTENDED, NONTENDER. NORMAL BOWEL SOUNDS. CT REVIEWED. NEUROSURGERY TO SEE.              Armani Lance MD  10/15/18 1276

## 2018-10-15 NOTE — CONSULTS
Narrative    No narrative on file       Family History:   History reviewed. No pertinent family history. Allergies:  Levaquin [levofloxacin] and Penicillins    Home Medications:  Prior to Admission medications    Medication Sig Start Date End Date Taking?  Authorizing Provider   acetaminophen (TYLENOL) 325 MG tablet Take 650 mg by mouth every 6 hours as needed for Pain   Yes Historical Provider, MD   calcium carbonate-vitamin D3 (CALCIUM 600-D) 600-400 MG-UNIT TABS Take 1 tablet by mouth daily   Yes Historical Provider, MD   ipratropium-albuterol (DUONEB) 0.5-2.5 (3) MG/3ML SOLN nebulizer solution Inhale 1 vial into the lungs every 4 hours as needed for Shortness of Breath   Yes Historical Provider, MD   memantine (NAMENDA) 10 MG tablet Take 10 mg by mouth 2 times daily   Yes Historical Provider, MD   mirtazapine (REMERON) 7.5 MG tablet Take 7.5 mg by mouth nightly For appetite   Yes Historical Provider, MD   levothyroxine (SYNTHROID) 125 MCG tablet Take 125 mcg by mouth every morning    Yes Historical Provider, MD   donepezil (ARICEPT) 10 MG tablet Take 10 mg by mouth nightly   Yes Historical Provider, MD   rivaroxaban (XARELTO) 20 MG TABS tablet Take 20 mg by mouth daily Indications: **Give with a meal for absorption** **Give with a meal for absorption**   Yes Historical Provider, MD   risperiDONE (RISPERDAL) 0.25 MG tablet Take 0.25 mg by mouth daily    Yes Historical Provider, MD   isosorbide dinitrate (ISORDIL) 20 MG tablet Take 20 mg by mouth 2 times daily Indications: 1100 and 2000 to ensure adequate \"nitrate-free period\" 1100 and 2000 to ensure adequate \"nitrate-free period\"   Yes Historical Provider, MD   levETIRAcetam (KEPPRA) 500 MG tablet Take 500 mg by mouth 2 times daily   Yes Historical Provider, MD   Ascorbic Acid (VITAMIN C) 250 MG tablet Take 250 mg by mouth 2 times daily   Yes Historical Provider, MD   aspirin 81 MG tablet Take 81 mg by mouth every morning    Yes Historical Provider, MD   Skin

## 2018-10-15 NOTE — FLOWSHEET NOTE
[facility]     Emergency/Trauma Note    PATIENT NAME: Paul Carrillo    Shift date: 10/15/18  Shift day: Monday   Shift # 2    Room #    Name: Paul Carrillo            Age: 80 y.o. Gender: female          Lutheran: Other   Place of Roman Catholic:     Trauma/Incident type: Stroke Alert  Admit Date & Time: 10/15/2018  4:24 PM      PATIENT/EVENT DESCRIPTION:  Paul Carrillo is a 80 y.o. female who arrived via EMS from [facility] as a Stroke Alert. George Hatchet had some difficulty speaks but could make herself understood. Patient spoke of   and that her son lives in [facility],  Patient had a great jairo and trust in God and in [place].    Patient said several times times that God brought her here safe thus far. Pt to be admitted to . SPIRITUAL ASSESSMENT/INTERVENTION:    Talked to patient asked here about her support systems.  provided a listening presence to the patient.  prayed with the patient. Stefani Bar PATIENT BELONGINGS:  No belongings noted    ANY BELONGINGS OF SIGNIFICANT VALUE NOTED:  none    REGISTRATION STAFF NOTIFIED? yes      WHAT IS YOUR SPIRITUAL CARE PLAN FOR THIS PATIENT?:  Spiritual Care is Available to provide spiritual and emotional al support       10/15/18 1719   Encounter Summary   Services provided to: Patient   Referral/Consult From: Multi-disciplinary team   Support System Children  (Son Jessy Santillan lives in [facility])   Place of Rastafari None   Continue Visiting (11/15/18)   Complexity of Encounter Moderate   Length of Encounter 15 minutes   Spiritual Assessment Completed Yes   Crisis   Type Stroke Alert   Assessment Calm; Approachable; Hopeful   Intervention Active listening;Explored feelings, thoughts, concerns;Prayer   Outcome Expressed gratitude       Electronically signed by Tammy Crocker Resident, on 10/15/2018 at 5:24 PM.  [address] [place]  [phone]

## 2018-10-15 NOTE — ED NOTES
Bed: 08  Expected date:   Expected time:   Means of arrival:   Comments:     Marie Montaño RN  10/15/18 6669
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
## 2018-10-15 NOTE — ED PROVIDER NOTES
rash and wound. Neurological: Positive for facial asymmetry, speech difficulty and weakness. Negative for dizziness, tremors, seizures, syncope, numbness and headaches. Psychiatric/Behavioral: Negative for confusion, decreased concentration, hallucinations, self-injury, sleep disturbance and suicidal ideas. PAST MEDICAL HISTORY     Past Medical History:   Diagnosis Date    Anemia     [facility] ([facility] [address]     [facility]     Dementia     Gastric reflux     Hyperlipidemia     Hypothyroid     Pneumonia        SURGICAL HISTORY     History reviewed. No pertinent surgical history.     CURRENT MEDICATIONS       Previous Medications    ACETAMINOPHEN (TYLENOL) 325 MG TABLET    Take 650 mg by mouth every 6 hours as needed for Pain    ASCORBIC ACID (VITAMIN C) 250 MG TABLET    Take 250 mg by mouth 2 times daily    ASPIRIN 81 MG TABLET    Take 81 mg by mouth every morning     CALCIUM CARBONATE-VITAMIN D3 (CALCIUM 600-D) 600-400 MG-UNIT TABS    Take 1 tablet by mouth daily    DOCUSATE SODIUM (COLACE) 100 MG CAPSULE    Take 100 mg by mouth 2 times daily    DONEPEZIL (ARICEPT) 10 MG TABLET    Take 10 mg by mouth nightly    IPRATROPIUM-ALBUTEROL (DUONEB) 0.5-2.5 (3) MG/3ML SOLN NEBULIZER SOLUTION    Inhale 1 vial into the lungs every 4 hours as needed for Shortness of Breath    ISOSORBIDE DINITRATE (ISORDIL) 20 MG TABLET    Take 20 mg by mouth 2 times daily Indications: 1100 and 2000 to ensure adequate \"nitrate-free period\" 1100 and 2000 to ensure adequate \"nitrate-free period\"    LEVETIRACETAM (KEPPRA) 500 MG TABLET    Take 500 mg by mouth 2 times daily    LEVOTHYROXINE (SYNTHROID) 125 MCG TABLET    Take 125 mcg by mouth every morning     MEMANTINE (NAMENDA) 10 MG TABLET    Take 10 mg by mouth 2 times daily    MIRTAZAPINE (REMERON) 7.5 MG TABLET    Take 7.5 mg by mouth nightly For appetite    MULTIPLE VITAMINS-MINERALS (THERAPEUTIC MULTIVITAMIN-MINERALS) TABLET    Take 1 tablet

## 2018-10-15 NOTE — ED NOTES
1mg ativan IVP given per verbal orders by Dr. Christina Loepz to allow pt to tolerate CT scan.      Damon Chowdary RN  10/15/18 8993
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
## 2018-10-16 ENCOUNTER — APPOINTMENT (OUTPATIENT)
Dept: GENERAL RADIOLOGY | Age: 83
DRG: 092 | End: 2018-10-16
Payer: MEDICARE

## 2018-10-16 LAB
ABSOLUTE EOS #: 0.38 K/UL (ref 0–0.44)
ABSOLUTE IMMATURE GRANULOCYTE: <0.03 K/UL (ref 0–0.3)
ABSOLUTE LYMPH #: 1.65 K/UL (ref 1.1–3.7)
ABSOLUTE MONO #: 0.75 K/UL (ref 0.1–1.2)
ANION GAP SERPL CALCULATED.3IONS-SCNC: 7 MMOL/L (ref 9–17)
BASOPHILS # BLD: 1 % (ref 0–2)
BASOPHILS ABSOLUTE: 0.06 K/UL (ref 0–0.2)
BUN BLDV-MCNC: 8 MG/DL (ref 8–23)
BUN/CREAT BLD: ABNORMAL (ref 9–20)
CALCIUM IONIZED: 1.19 MMOL/L (ref 1.13–1.33)
CALCIUM SERPL-MCNC: 8.7 MG/DL (ref 8.6–10.4)
CHLORIDE BLD-SCNC: 101 MMOL/L (ref 98–107)
CO2: 30 MMOL/L (ref 20–31)
CREAT SERPL-MCNC: 0.66 MG/DL (ref 0.5–0.9)
DIFFERENTIAL TYPE: ABNORMAL
EOSINOPHILS RELATIVE PERCENT: 6 % (ref 1–4)
GFR AFRICAN AMERICAN: >60 ML/MIN
GFR NON-AFRICAN AMERICAN: >60 ML/MIN
GFR SERPL CREATININE-BSD FRML MDRD: ABNORMAL ML/MIN/{1.73_M2}
GFR SERPL CREATININE-BSD FRML MDRD: ABNORMAL ML/MIN/{1.73_M2}
GLUCOSE BLD-MCNC: 81 MG/DL (ref 70–99)
GLUCOSE BLD-MCNC: 97 MG/DL (ref 65–105)
HCT VFR BLD CALC: 36.4 % (ref 36.3–47.1)
HEMOGLOBIN: 11.2 G/DL (ref 11.9–15.1)
IMMATURE GRANULOCYTES: 0 %
LYMPHOCYTES # BLD: 24 % (ref 24–43)
MAGNESIUM: 2.2 MG/DL (ref 1.6–2.6)
MCH RBC QN AUTO: 30.5 PG (ref 25.2–33.5)
MCHC RBC AUTO-ENTMCNC: 30.8 G/DL (ref 28.4–34.8)
MCV RBC AUTO: 99.2 FL (ref 82.6–102.9)
MONOCYTES # BLD: 11 % (ref 3–12)
MRSA, DNA, NASAL: NORMAL
NRBC AUTOMATED: 0 PER 100 WBC
PDW BLD-RTO: 13.1 % (ref 11.8–14.4)
PHOSPHORUS: 3 MG/DL (ref 2.6–4.5)
PLATELET # BLD: 218 K/UL (ref 138–453)
PLATELET ESTIMATE: ABNORMAL
PMV BLD AUTO: 10.3 FL (ref 8.1–13.5)
POTASSIUM SERPL-SCNC: 4.4 MMOL/L (ref 3.7–5.3)
RBC # BLD: 3.67 M/UL (ref 3.95–5.11)
RBC # BLD: ABNORMAL 10*6/UL
SEG NEUTROPHILS: 58 % (ref 36–65)
SEGMENTED NEUTROPHILS ABSOLUTE COUNT: 4.09 K/UL (ref 1.5–8.1)
SODIUM BLD-SCNC: 138 MMOL/L (ref 135–144)
SPECIMEN DESCRIPTION: NORMAL
WBC # BLD: 7 K/UL (ref 3.5–11.3)
WBC # BLD: ABNORMAL 10*3/UL

## 2018-10-16 PROCEDURE — 2060000000 HC ICU INTERMEDIATE R&B

## 2018-10-16 PROCEDURE — G8999 MOTOR SPEECH CURRENT STATUS: HCPCS

## 2018-10-16 PROCEDURE — 71045 X-RAY EXAM CHEST 1 VIEW: CPT

## 2018-10-16 PROCEDURE — G9169 MEMORY GOAL STATUS: HCPCS

## 2018-10-16 PROCEDURE — 6370000000 HC RX 637 (ALT 250 FOR IP): Performed by: NURSE PRACTITIONER

## 2018-10-16 PROCEDURE — 97162 PT EVAL MOD COMPLEX 30 MIN: CPT

## 2018-10-16 PROCEDURE — 82330 ASSAY OF CALCIUM: CPT

## 2018-10-16 PROCEDURE — 36415 COLL VENOUS BLD VENIPUNCTURE: CPT

## 2018-10-16 PROCEDURE — 99233 SBSQ HOSP IP/OBS HIGH 50: CPT | Performed by: PSYCHIATRY & NEUROLOGY

## 2018-10-16 PROCEDURE — 92610 EVALUATE SWALLOWING FUNCTION: CPT

## 2018-10-16 PROCEDURE — G8979 MOBILITY GOAL STATUS: HCPCS

## 2018-10-16 PROCEDURE — 6360000002 HC RX W HCPCS: Performed by: EMERGENCY MEDICINE

## 2018-10-16 PROCEDURE — 2580000003 HC RX 258: Performed by: NURSE PRACTITIONER

## 2018-10-16 PROCEDURE — 94762 N-INVAS EAR/PLS OXIMTRY CONT: CPT

## 2018-10-16 PROCEDURE — 94640 AIRWAY INHALATION TREATMENT: CPT

## 2018-10-16 PROCEDURE — G8987 SELF CARE CURRENT STATUS: HCPCS

## 2018-10-16 PROCEDURE — G8996 SWALLOW CURRENT STATUS: HCPCS

## 2018-10-16 PROCEDURE — 83735 ASSAY OF MAGNESIUM: CPT

## 2018-10-16 PROCEDURE — G9168 MEMORY CURRENT STATUS: HCPCS

## 2018-10-16 PROCEDURE — 97166 OT EVAL MOD COMPLEX 45 MIN: CPT

## 2018-10-16 PROCEDURE — 84100 ASSAY OF PHOSPHORUS: CPT

## 2018-10-16 PROCEDURE — 82947 ASSAY GLUCOSE BLOOD QUANT: CPT

## 2018-10-16 PROCEDURE — 80048 BASIC METABOLIC PNL TOTAL CA: CPT

## 2018-10-16 PROCEDURE — 97530 THERAPEUTIC ACTIVITIES: CPT

## 2018-10-16 PROCEDURE — G9186 MOTOR SPEECH GOAL STATUS: HCPCS

## 2018-10-16 PROCEDURE — 92523 SPEECH SOUND LANG COMPREHEN: CPT

## 2018-10-16 PROCEDURE — 85025 COMPLETE CBC W/AUTO DIFF WBC: CPT

## 2018-10-16 PROCEDURE — 87641 MR-STAPH DNA AMP PROBE: CPT

## 2018-10-16 PROCEDURE — 2700000000 HC OXYGEN THERAPY PER DAY

## 2018-10-16 PROCEDURE — G8988 SELF CARE GOAL STATUS: HCPCS

## 2018-10-16 PROCEDURE — G8978 MOBILITY CURRENT STATUS: HCPCS

## 2018-10-16 PROCEDURE — G8997 SWALLOW GOAL STATUS: HCPCS

## 2018-10-16 RX ORDER — ASPIRIN 81 MG/1
81 TABLET, CHEWABLE ORAL DAILY
Status: DISCONTINUED | OUTPATIENT
Start: 2018-10-16 | End: 2018-10-18 | Stop reason: HOSPADM

## 2018-10-16 RX ORDER — IPRATROPIUM BROMIDE AND ALBUTEROL SULFATE 2.5; .5 MG/3ML; MG/3ML
1 SOLUTION RESPIRATORY (INHALATION) 4 TIMES DAILY
Status: DISCONTINUED | OUTPATIENT
Start: 2018-10-16 | End: 2018-10-18 | Stop reason: HOSPADM

## 2018-10-16 RX ORDER — ALBUTEROL SULFATE 2.5 MG/3ML
2.5 SOLUTION RESPIRATORY (INHALATION)
Status: DISCONTINUED | OUTPATIENT
Start: 2018-10-16 | End: 2018-10-16

## 2018-10-16 RX ORDER — POLYETHYLENE GLYCOL 3350 17 G/17G
17 POWDER, FOR SOLUTION ORAL NIGHTLY
Status: DISCONTINUED | OUTPATIENT
Start: 2018-10-16 | End: 2018-10-18 | Stop reason: HOSPADM

## 2018-10-16 RX ORDER — LEVETIRACETAM 5 MG/ML
500 INJECTION INTRAVASCULAR EVERY 12 HOURS
Status: DISCONTINUED | OUTPATIENT
Start: 2018-10-16 | End: 2018-10-18 | Stop reason: HOSPADM

## 2018-10-16 RX ADMIN — ACETAMINOPHEN 650 MG: 325 TABLET ORAL at 08:53

## 2018-10-16 RX ADMIN — Medication 10 ML: at 08:40

## 2018-10-16 RX ADMIN — Medication 10 ML: at 22:42

## 2018-10-16 RX ADMIN — POLYETHYLENE GLYCOL 3350 17 G: 17 POWDER, FOR SOLUTION ORAL at 22:31

## 2018-10-16 RX ADMIN — MEMANTINE HYDROCHLORIDE 10 MG: 5 TABLET, FILM COATED ORAL at 22:30

## 2018-10-16 RX ADMIN — DONEPEZIL HYDROCHLORIDE 10 MG: 10 TABLET, FILM COATED ORAL at 22:31

## 2018-10-16 RX ADMIN — MEMANTINE HYDROCHLORIDE 10 MG: 5 TABLET, FILM COATED ORAL at 08:47

## 2018-10-16 RX ADMIN — DOCUSATE SODIUM 100 MG: 100 CAPSULE, LIQUID FILLED ORAL at 08:40

## 2018-10-16 RX ADMIN — LEVETIRACETAM 500 MG: 5 INJECTION INTRAVENOUS at 16:55

## 2018-10-16 RX ADMIN — IPRATROPIUM BROMIDE AND ALBUTEROL SULFATE 3 ML: .5; 3 SOLUTION RESPIRATORY (INHALATION) at 20:41

## 2018-10-16 RX ADMIN — DOCUSATE SODIUM 100 MG: 100 CAPSULE, LIQUID FILLED ORAL at 22:31

## 2018-10-16 RX ADMIN — LEVETIRACETAM 500 MG: 5 INJECTION INTRAVENOUS at 04:39

## 2018-10-16 RX ADMIN — RISPERIDONE 0.25 MG: 0.25 TABLET, FILM COATED ORAL at 08:47

## 2018-10-16 RX ADMIN — DOCUSATE SODIUM - SENNOSIDES 2 TABLET: 50; 8.6 TABLET, FILM COATED ORAL at 08:47

## 2018-10-16 RX ADMIN — Medication 81 MG: at 22:33

## 2018-10-16 RX ADMIN — RIVAROXABAN 20 MG: 20 TABLET, FILM COATED ORAL at 16:54

## 2018-10-16 RX ADMIN — IPRATROPIUM BROMIDE AND ALBUTEROL SULFATE 3 ML: .5; 3 SOLUTION RESPIRATORY (INHALATION) at 13:30

## 2018-10-16 RX ADMIN — LEVOTHYROXINE SODIUM 125 MCG: 125 TABLET ORAL at 08:40

## 2018-10-16 ASSESSMENT — PAIN DESCRIPTION - DESCRIPTORS
DESCRIPTORS: ACHING;DISCOMFORT;SORE
DESCRIPTORS: DISCOMFORT

## 2018-10-16 ASSESSMENT — PAIN DESCRIPTION - FREQUENCY: FREQUENCY: CONTINUOUS

## 2018-10-16 ASSESSMENT — PAIN SCALES - GENERAL
PAINLEVEL_OUTOF10: 4
PAINLEVEL_OUTOF10: 2
PAINLEVEL_OUTOF10: 4
PAINLEVEL_OUTOF10: 7

## 2018-10-16 ASSESSMENT — PAIN DESCRIPTION - PAIN TYPE: TYPE: CHRONIC PAIN

## 2018-10-16 ASSESSMENT — PAIN DESCRIPTION - ORIENTATION: ORIENTATION: LOWER

## 2018-10-16 ASSESSMENT — PAIN DESCRIPTION - LOCATION
LOCATION: SHOULDER
LOCATION: BACK
LOCATION: BACK
LOCATION: SHOULDER

## 2018-10-16 ASSESSMENT — PAIN - FUNCTIONAL ASSESSMENT
PAIN_FUNCTIONAL_ASSESSMENT: 0-10
PAIN_FUNCTIONAL_ASSESSMENT: 0-10

## 2018-10-16 ASSESSMENT — PAIN SCALES - WONG BAKER
WONGBAKER_NUMERICALRESPONSE: 4
WONGBAKER_NUMERICALRESPONSE: 4

## 2018-10-16 NOTE — PLAN OF CARE
Problem: Risk for Impaired Skin Integrity  Goal: Tissue integrity - skin and mucous membranes  Structural intactness and normal physiological function of skin and  mucous membranes.    Outcome: Ongoing  Pt mucous membranes remain pink and moist

## 2018-10-16 NOTE — PROGRESS NOTES
10 mL Intravenous 2 times per day    sennosides-docusate sodium  2 tablet Oral Daily     CONTINUOUS INFUSIONS:   sodium chloride 75 mL/hr at 10/15/18 2248     PRN MEDICATIONS:   ipratropium-albuterol, sodium chloride flush, magnesium hydroxide, ondansetron, acetaminophen, labetalol    VITALS:  Temperature Range: Temp: 97.3 °F (36.3 °C) Temp  Av.5 °F (36.4 °C)  Min: 96.7 °F (35.9 °C)  Max: 98 °F (36.7 °C)  BP Range: Systolic (33UOL), RWD:614 , Min:96 , NLI:833     Diastolic (02RWB), YPA:92, Min:50, Max:113    Pulse Range: Pulse  Av.2  Min: 48  Max: 74  Respiration Range: Resp  Av  Min: 8  Max: 20  Current Pulse Ox: SpO2: 94 %  24HR Pulse Ox Range: SpO2  Av.3 %  Min: 88 %  Max: 100 %  Patient Vitals for the past 12 hrs:   BP Temp Temp src Pulse Resp SpO2 Height Weight   10/16/18 0902 (!) 149/59 - - 55 13 94 % - -   10/16/18 0802 (!) 142/82 - - 50 12 99 % - -   10/16/18 0737 (!) 112/55 97.3 °F (36.3 °C) - (!) 48 8 97 % - -   10/16/18 0502 (!) 146/57 - - 53 12 100 % - -   10/16/18 0403 128/66 98 °F (36.7 °C) - 51 12 99 % - -   10/16/18 0302 (!) 153/56 - - 54 13 96 % - -   10/16/18 0205 136/84 - - 74 14 - - -   10/16/18 0103 (!) 149/61 - - 52 11 100 % - -   10/16/18 0003 (!) 160/113 98 °F (36.7 °C) - 53 14 99 % - -   10/15/18 2303 (!) 153/55 - - 52 15 99 % - -   10/15/18 2241 (!) 137/99 97.7 °F (36.5 °C) - 57 16 99 % - -   10/15/18 2237 - - - - - - 5' 4\" (1.626 m) 151 lb 0.2 oz (68.5 kg)   10/15/18 2235 (!) 137/99 97.2 °F (36.2 °C) Oral 57 13 - 5' 3\" (1.6 m) -     Estimated body mass index is 25.92 kg/m² as calculated from the following:    Height as of this encounter: 5' 4\" (1.626 m).     Weight as of this encounter: 151 lb 0.2 oz (68.5 kg).  []<16 Severe malnutrition  []16-16.99 Moderate malnutrition  []17-18.49 Mild malnutrition  []18.5-24.9 Normal  [x]25-29.9 Overweight (not obese)  []30-34.9 Obese class 1 (Low Risk)  []35-39.9 Obese class 2 (Moderate Risk)  []?40 Obese class 3 (High demonstrate no acute abnormality. SINUSES: The visualized paranasal sinuses and mastoid air cells demonstrate no acute abnormality. SOFT TISSUES/SKULL:  No acute abnormality of the visualized skull or soft tissues. Motion artifacts. Question of a right cerebral convexity chronic subdural hematoma or age-indeterminate subdural hygroma measuring up to 7 mm in maximal thickness, new since November 4, 2015. 3.4 mm right to left midline shift, grossly stable since the prior study. Mild parenchymal volume loss. Mild chronic microvascular disease. The results were reported to Dr. Иван Durant at 12:57 p.m. on October 15, 2018. Xr Chest Portable    Result Date: 10/15/2018  EXAMINATION: SINGLE XRAY VIEW OF THE CHEST 10/15/2018 5:03 pm COMPARISON: October 15, 2018 HISTORY: ORDERING SYSTEM PROVIDED HISTORY: hypoxia TECHNOLOGIST PROVIDED HISTORY: hypoxia FINDINGS: Gallstones noted incidentally in the right upper quadrant. The lungs are without acute focal process. There is no effusion or pneumothorax. The cardiomediastinal silhouette is without acute process. The osseous structures are without acute process. No acute process. Gallstones. Xr Chest Portable    Result Date: 10/15/2018  EXAMINATION: SINGLE XRAY VIEW OF THE CHEST 10/15/2018 1:32 pm COMPARISON: None. HISTORY: ORDERING SYSTEM PROVIDED HISTORY: Stroke TECHNOLOGIST PROVIDED HISTORY: Stroke Ordering Physician Provided Reason for Exam: STOKE protocol. The patient is unable so sit still Acuity: Unknown Type of Exam: Unknown FINDINGS: Motion artifact degrades this exam.  The patient is rotated. The cardiac silhouette enlargement is noted. Increased density the left lung base is noted. No evidence for pneumothorax. The left costophrenic angle is obscured. Calcified atheromatous plaque is present. Gaseous distention of colon in the upper abdomen is noted. An IVC filter is present. Exam degraded by motion artifact.   Opacity in the left lung base may represent consolidation or atelectasis. A small left effusion may also be present. Mild gaseous distention of bowel in the upper abdomen. IVC filter. Cta Neck W Contrast    Result Date: 10/15/2018  EXAMINATION: CTA OF THE NECK 10/15/2018 5:36 pm TECHNIQUE: CTA of the neck was performed with the administration of intravenous contrast. Multiplanar reformatted images are provided for review. MIP images are provided for review. Stenosis of the internal carotid arteries measured using NASCET criteria. Dose modulation, iterative reconstruction, and/or weight based adjustment of the mA/kV was utilized to reduce the radiation dose to as low as reasonably achievable. COMPARISON: CT of the head from today HISTORY: ORDERING SYSTEM PROVIDED HISTORY: left sided weakness, slurred speech and facial droop TECHNOLOGIST PROVIDED HISTORY: FINDINGS: AORTIC ARCH/ARCH VESSELS: There is a normal branch pattern of the aortic arch. No significant stenosis is seen of the innominate artery or subclavian arteries. Scattered calcified plaque noted. No significant stenoses. CAROTID ARTERIES: The common carotid arteries are normal in appearance without evidence of a flow limiting stenosis. The internal carotid arteries are normal in appearance without evidence of a flow limiting stenosis by NASCET criteria. No dissection or arterial injury is seen. Small amount of calcified plaque in the bulbs with no significant stenosis. VERTEBRAL ARTERIES: The vertebral arteries both arise from the subclavian arteries and are normal in caliber without evidence of flow limiting stenosis or dissection. SOFT TISSUES:  The lung apices demonstrate left upper lobe pleuroparenchymal opacity. No cervical or superior mediastinal lymphadenopathy. The visualized portion of the larynx and pharynx appear unremarkable. The parotid, submandibular and thyroid glands demonstrate no acute abnormality. BONES: The visualized osseous structures appear unremarkable.

## 2018-10-16 NOTE — ED NOTES
Report called to [facility] on [address] [facility]  10/15/18 2379

## 2018-10-16 NOTE — PROGRESS NOTES
questions, immediate and delayed recall (verbal and visual units), thought organization, verbal reasoning, word generation, and verbal sequencing tasks. Pt with mild-moderate dysarthria characterized by decreased speech intelligibility (about 75% intelligible in quiet room). No oral motor deficits noted. ST to follow up to address noted deficits. Verbal education provided. Recommendations:  Requires SLP Intervention: Yes  Duration/Frequency of Treatment: 3-5x per week  D/C Recommendations: Inpatient rehabilitation       Plan:   Goals:  Short-term Goals  Goal 1: Pt will improve orientation to place, time, and situation to 100%. Goal 2: Pt will recall 3-5 units with and without distractions. Goal 3: Pt will complete thought organization and verbal reasoning tasks with 90% accuracy. Goal 4: Pt will sequence 4-6 steps to common activities with 90% accuracy. Goal 5: Pt will utilize dysarthria compensatory strategies in conversation in 90% of opportunities. Goal 6: Pt will complete word generation tasks with 90% accuracy. Patient/family involved in developing goals and treatment plan: yes    Subjective:   Previous level of function and limitations:  General  Chart Reviewed: Yes  Family / Caregiver Present: No  Social/Functional History  Lives With: Significant other  Vision  Vision: Within Functional Limits  Hearing  Hearing: Within functional limits           Objective:     Oral/Motor  Oral Motor: Within functional limits    Auditory Comprehension  Comprehension: Within Functional Limits     Expression  Primary Mode of Expression: Verbal    Verbal Expression  Verbal Expression: Within functional limits     Motor Speech  Motor Speech: Exceptions to [facility]  Intelligibility: Mild-Moderate  Dysarthria : Mild-Moderate         Cognition:      Orientation  Overall Orientation Status: Impaired  Orientation Level: Oriented to person;Disoriented to place; Disoriented to time;Disoriented to

## 2018-10-16 NOTE — ED PROVIDER NOTES
(LABS / IMAGING / EKG):  Orders Placed This Encounter   Procedures    XR CHEST PORTABLE    CTA HEAD W CONTRAST     [facility] PANEL    Inpatient consult to Stroke Team    EKG 12 Lead    TYPE AND SCREEN    Insert peripheral IV    PATIENT STATUS (FROM ED OR OR/PROCEDURAL) Inpatient       MEDICATIONS ORDERED:  Orders Placed This Encounter   Medications    iopamidol (ISOVUE-370) 76 % injection 90 mL    LORazepam (ATIVAN) 2 MG/ML injection     JJ CARDOSO: cabinet override    LORazepam (ATIVAN) injection 1 mg       DDX: Stroke    DIAGNOSTIC RESULTS / EMERGENCY DEPARTMENT COURSE / MDM     LABS:  Results for orders placed or performed during the hospital encounter of 10/15/18   STROKE PANEL   Result Value Ref Range    WBC 9.7 3.5 - 11.3 k/uL    RBC 4.01 3.95 - 5.11 m/uL    Hemoglobin 12.3 11.9 - 15.1 g/dL    Hematocrit 40.2 36.3 - 47.1 %    .2 82.6 - 102.9 fL    MCH 30.7 25.2 - 33.5 pg    MCHC 30.6 28.4 - 34.8 g/dL    RDW 13.2 11.8 - 14.4 %    Platelets 083 005 - 644 k/uL    MPV 9.8 8.1 - 13.5 fL    NRBC Automated 0.0 0.0 per 100 WBC    Differential Type NOT REPORTED     Seg Neutrophils 80 (H) 36 - 65 %    Lymphocytes 12 (L) 24 - 43 %    Monocytes 6 3 - 12 %    Eosinophils % 1 1 - 4 %    Basophils 0 0 - 2 %    Immature Granulocytes 1 (H) 0 %    Segs Absolute 7.76 1.50 - 8.10 k/uL    Absolute Lymph # 1.14 1.10 - 3.70 k/uL    Absolute Mono # 0.61 0.10 - 1.20 k/uL    Absolute Eos # 0.08 0.00 - 0.44 k/uL    Basophils # 0.03 0.00 - 0.20 k/uL    Absolute Immature Granulocyte 0.05 0.00 - 0.30 k/uL    WBC Morphology NOT REPORTED     RBC Morphology NOT REPORTED     Platelet Estimate NOT REPORTED     Protime 11.8 9.0 - 12.0 sec    INR 1.1     PTT 32.6 (H) 20.5 - 30.5 sec    Myoglobin 33 25 - 58 ng/mL    Troponin T <0.03 <0.03 ng/mL    Troponin Interp          Glucose 99 70 - 99 mg/dL    BUN 9 8 - 23 mg/dL    CREATININE 0.72 0.50 - 0.90 mg/dL    Bun/Cre Ratio NOT REPORTED 9 - 20    Calcium cervical or superior mediastinal lymphadenopathy. The visualized portion of the larynx and pharynx appear unremarkable. The parotid, submandibular and thyroid glands demonstrate no acute abnormality. BONES: The visualized osseous structures appear unremarkable. Left upper lobe airspace disease. Otherwise unremarkable CTA of the neck. RECOMMENDATIONS: Case discussed with Dr. Elissa Pereyra at 6 p.mHannah Alexandre Cta Head W Contrast    Result Date: 10/15/2018  EXAMINATION: CTA OF THE HEAD WITH CONTRAST  10/15/2018 5:36 pm: TECHNIQUE: CTA of the head/brain was performed with the administration of intravenous contrast. Multiplanar reformatted images are provided for review. MIP images are provided for review. Dose modulation, iterative reconstruction, and/or weight based adjustment of the mA/kV was utilized to reduce the radiation dose to as low as reasonably achievable. COMPARISON: CT head from today. HISTORY: ORDERING SYSTEM PROVIDED HISTORY: new left sided weakness, facial droop TECHNOLOGIST PROVIDED HISTORY: FINDINGS: ANTERIOR CIRCULATION: Calcified plaque noted in the cavernous segments of the internal carotid arteries bilaterally with no significant stenosis. The internal carotid arteries are normal in course and caliber without focal stenosis. The anterior cerebral and middle cerebral arteries demonstrate no focal stenosis. POSTERIOR CIRCULATION: The posterior cerebral arteries demonstrate no focal stenosis. The vertebral and basilar arteries appear unremarkable. BRAIN: Right pterional craniotomy is again noted with subjacent encephalomalacia or surgical bed. Coarse dystrophic intraparenchymal calcifications are also noted. Small hypodense extra-axial fluid collection again noted measuring up to 7 to 8 mm. Slight right to left midline shift again noted. Status post remote right pterional craniotomy and subjacent hygroma again noted. Otherwise unremarkable CTA of the head.  RECOMMENDATIONS: The findings were sent

## 2018-10-16 NOTE — PROGRESS NOTES
MD   Skin Protectants, Misc. (DIMETHICONE-ZINC OXIDE) cream Apply topically 2 times daily Apply topically to buttocks 2 times daily.     Historical Provider, MD   docusate sodium (COLACE) 100 MG capsule Take 100 mg by mouth 2 times daily    Historical Provider, MD   polyethylene glycol (GLYCOLAX) powder Take 17 g by mouth nightly     Historical Provider, MD   Multiple Vitamins-Minerals (THERAPEUTIC MULTIVITAMIN-MINERALS) tablet Take 1 tablet by mouth daily    Historical Provider, MD       RR 18  Breath Sounds: Rhonchi t/o    Pt home meds state she takes Duoneb PRN     · Bronchodilator assessment at level  1  · Hyperinflation assessment at level   · Secretion Management assessment at level    ·   · [x]    Bronchodilator Assessment  BRONCHODILATOR ASSESSMENT SCORE  Score 0 1 2 3 4 5   Breath Sounds   []  Patient Baseline [x]  No Wheeze good aeration []  Faint, scattered wheezing, good aeration []  Expiratory Wheezing and or moderately diminished []  Insp/Exp wheeze and/or very diminished []  Insp/Exp and/ or marked distress   Respiratory Rate   []  Patient Baseline [x]  Less than 20 [x]  Less than 20 []  20-25 []  Greater than 25 []  Greater than 25   Peak flow % of Pred or PB [x]  NA   []  Greater than 90%  []  81-90% []  71-80% []  Less than or equal to 70%  or unable to perform []  Unable due to Respiratory Distress   Dyspnea re []  Patient Baseline [x]  No SOB [x]  No SOB []  SOB on exertion []  SOB min activity []  At rest/acute   e FEV% Predicted       [x]  NA []  Above 69%  []  Unable []  Above 60-69%  []  Unable []  Above 50-59%  []  Unable []  Above 35-49%  []  Unable []  Less than 35%  []  Unable                 []  Hyperinflation Assessment  Score 1 2 3   CXR and Breath Sounds   []  Clear []  No atelectasis  Basilar aeration []  Atelectasis or absent basilar breath sounds   Incentive Spirometry Volume  (Per IBW)   []  Greater than or equal to 15ml/Kg []  less than 15ml/Kg []  less than 15ml/Kg   Surgery within last 2 weeks []  None or general   []  Abdominal or thoracic surgery  []  Abdominal or thoracic   Chronic Pulmonary Historyre []  No []  Yes []  Yes     []  Secretion Management Assessment  Score 1 2 3   Bilateral Breath Sounds   []  Occasional Rhonchi [x]  Scattered Rhonchi []  Course Rhonchi and/or poor aeration   Sputum    []  Small amount of thin secretions [x]  Moderate amount of viscous secretions []  Copius, Viscious Yellow/ Secretions   CXR as reported by physician []  clear  []  Unavailable []  Infiltrates and/or consolidation  [x]  Unavailable []  Mucus Plugging and or lobar consolidation  []  Unavailable   Cough []  Strong, productive cough [x]  Weak productive cough []  No cough or weak non-productive cough   Ravi Salguero  3:47 AM                            FEMALE                                  MALE                            FEV1 Predicted Normal Values                        FEV1 Predicted Normal Values          Age                                     Height in Feet and Inches       Age                                     Height in Feet and Inches       4' 11\" 5' 1\" 5' 3\" 5' 5\" 5' 7\" 5' 9\" 5' 11\" 6' 1\"  4' 11\" 5' 1\" 5' 3\" 5' 5\" 5' 7\" 5' 9\" 5' 11\" 6' 1\"   42 - 45 2.49 2.66 2.84 3.03 3.22 3.42 3.62 3.83 42 - 45 2.82 3.03 3.26 3.49 3.72 3.96 4.22 4.47   46 - 49 2.40 2.57 2.76 2.94 3.14 3.33 3.54 3.75 46 - 49 2.70 2.92 3.14 3.37 3.61 3.85 4.10 4.36   50 - 53 2.31 2.48 2.66 2.85 3.04 3.24 3.45 3.66 50 - 53 2.58 2.80 3.02 3.25 3.49 3.73 3.98 4.24   54 - 57 2.21 2.38 2.57 2.75 2.95 3.14 3.35 3.56 54 - 57 2.46 2.67 2.89 3.12 3.36 3.60 3.85 4.11   58 - 61 2.10 2.28 2.46 2.65 2.84 3.04 3.24 3.45 58 - 61 2.32 2.54 2.76 2.99 3.23 3.47 3.72 3.98   62 - 65 1.99 2.17 2.35 2.54 2.73 2.93 3.13 3.34 62 - 65 2.19 2.40 2.62 2.85 3.09 3.33 3.58 3.84   66 - 69 1.88 2.05 2.23 2.42 2.61 2.81 3.02 3.23 66 - 69 2.04 2.26 2.48 2.71 2.95 3.19 3.44 3.70   70+ 1.82 1.99 2.17 2.36 2.55 2.75 2.95 3.16 70+ 1.97 2.19 2.41 2.64

## 2018-10-17 ENCOUNTER — APPOINTMENT (OUTPATIENT)
Dept: GENERAL RADIOLOGY | Age: 83
DRG: 092 | End: 2018-10-17
Payer: MEDICARE

## 2018-10-17 PROBLEM — E78.5 HYPERLIPIDEMIA: Status: ACTIVE | Noted: 2018-10-17

## 2018-10-17 PROBLEM — I25.10 CORONARY ARTERY DISEASE INVOLVING NATIVE CORONARY ARTERY OF NATIVE HEART WITHOUT ANGINA PECTORIS: Status: ACTIVE | Noted: 2018-10-17

## 2018-10-17 PROBLEM — F03.90 DEMENTIA (HCC): Status: ACTIVE | Noted: 2018-10-17

## 2018-10-17 PROBLEM — R09.02 HYPOXIA: Status: ACTIVE | Noted: 2018-10-17

## 2018-10-17 LAB
ANION GAP SERPL CALCULATED.3IONS-SCNC: 5 MMOL/L (ref 9–17)
BUN BLDV-MCNC: 13 MG/DL (ref 8–23)
BUN/CREAT BLD: ABNORMAL (ref 9–20)
CALCIUM SERPL-MCNC: 8.1 MG/DL (ref 8.6–10.4)
CHLORIDE BLD-SCNC: 103 MMOL/L (ref 98–107)
CO2: 26 MMOL/L (ref 20–31)
CREAT SERPL-MCNC: 0.76 MG/DL (ref 0.5–0.9)
GFR AFRICAN AMERICAN: >60 ML/MIN
GFR NON-AFRICAN AMERICAN: >60 ML/MIN
GFR SERPL CREATININE-BSD FRML MDRD: ABNORMAL ML/MIN/{1.73_M2}
GFR SERPL CREATININE-BSD FRML MDRD: ABNORMAL ML/MIN/{1.73_M2}
GLUCOSE BLD-MCNC: 84 MG/DL (ref 70–99)
PHOSPHORUS: 3.5 MG/DL (ref 2.6–4.5)
POTASSIUM SERPL-SCNC: 4.7 MMOL/L (ref 3.7–5.3)
SODIUM BLD-SCNC: 134 MMOL/L (ref 135–144)

## 2018-10-17 PROCEDURE — 97530 THERAPEUTIC ACTIVITIES: CPT

## 2018-10-17 PROCEDURE — 6360000002 HC RX W HCPCS: Performed by: EMERGENCY MEDICINE

## 2018-10-17 PROCEDURE — 2580000003 HC RX 258: Performed by: NURSE PRACTITIONER

## 2018-10-17 PROCEDURE — 6370000000 HC RX 637 (ALT 250 FOR IP): Performed by: NURSE PRACTITIONER

## 2018-10-17 PROCEDURE — 71045 X-RAY EXAM CHEST 1 VIEW: CPT

## 2018-10-17 PROCEDURE — 36415 COLL VENOUS BLD VENIPUNCTURE: CPT

## 2018-10-17 PROCEDURE — 99222 1ST HOSP IP/OBS MODERATE 55: CPT | Performed by: FAMILY MEDICINE

## 2018-10-17 PROCEDURE — 80048 BASIC METABOLIC PNL TOTAL CA: CPT

## 2018-10-17 PROCEDURE — 94640 AIRWAY INHALATION TREATMENT: CPT

## 2018-10-17 PROCEDURE — 84100 ASSAY OF PHOSPHORUS: CPT

## 2018-10-17 PROCEDURE — 1200000000 HC SEMI PRIVATE

## 2018-10-17 RX ADMIN — DOCUSATE SODIUM - SENNOSIDES 2 TABLET: 50; 8.6 TABLET, FILM COATED ORAL at 08:49

## 2018-10-17 RX ADMIN — LEVETIRACETAM 500 MG: 5 INJECTION INTRAVENOUS at 16:43

## 2018-10-17 RX ADMIN — DOCUSATE SODIUM 100 MG: 100 CAPSULE, LIQUID FILLED ORAL at 20:52

## 2018-10-17 RX ADMIN — IPRATROPIUM BROMIDE AND ALBUTEROL SULFATE 3 ML: .5; 3 SOLUTION RESPIRATORY (INHALATION) at 10:40

## 2018-10-17 RX ADMIN — LEVETIRACETAM 500 MG: 5 INJECTION INTRAVENOUS at 05:00

## 2018-10-17 RX ADMIN — MEMANTINE HYDROCHLORIDE 10 MG: 5 TABLET, FILM COATED ORAL at 20:52

## 2018-10-17 RX ADMIN — RISPERIDONE 0.25 MG: 0.25 TABLET, FILM COATED ORAL at 08:49

## 2018-10-17 RX ADMIN — DOCUSATE SODIUM 100 MG: 100 CAPSULE, LIQUID FILLED ORAL at 08:49

## 2018-10-17 RX ADMIN — RIVAROXABAN 20 MG: 20 TABLET, FILM COATED ORAL at 17:30

## 2018-10-17 RX ADMIN — IPRATROPIUM BROMIDE AND ALBUTEROL SULFATE 3 ML: .5; 3 SOLUTION RESPIRATORY (INHALATION) at 15:24

## 2018-10-17 RX ADMIN — POLYETHYLENE GLYCOL 3350 17 G: 17 POWDER, FOR SOLUTION ORAL at 20:52

## 2018-10-17 RX ADMIN — DONEPEZIL HYDROCHLORIDE 10 MG: 10 TABLET, FILM COATED ORAL at 20:52

## 2018-10-17 RX ADMIN — MEMANTINE HYDROCHLORIDE 10 MG: 5 TABLET, FILM COATED ORAL at 08:49

## 2018-10-17 RX ADMIN — LEVOTHYROXINE SODIUM 125 MCG: 125 TABLET ORAL at 08:49

## 2018-10-17 RX ADMIN — Medication 10 ML: at 20:54

## 2018-10-17 RX ADMIN — ACETAMINOPHEN 650 MG: 325 TABLET ORAL at 08:55

## 2018-10-17 RX ADMIN — Medication 81 MG: at 08:49

## 2018-10-17 ASSESSMENT — PAIN SCALES - GENERAL
PAINLEVEL_OUTOF10: 9
PAINLEVEL_OUTOF10: 2
PAINLEVEL_OUTOF10: 3

## 2018-10-17 ASSESSMENT — PAIN DESCRIPTION - ORIENTATION: ORIENTATION: LEFT;LOWER

## 2018-10-17 ASSESSMENT — PAIN DESCRIPTION - DESCRIPTORS: DESCRIPTORS: ACHING

## 2018-10-17 ASSESSMENT — PAIN DESCRIPTION - FREQUENCY: FREQUENCY: CONTINUOUS

## 2018-10-17 ASSESSMENT — PAIN DESCRIPTION - LOCATION: LOCATION: KNEE;BACK

## 2018-10-17 NOTE — PROGRESS NOTES
BRONCHOSPASM/BRONCHOCONSTRICTION     [x]         IMPROVE AERATION/BREATH SOUNDS  [x]   ADMINISTER BRONCHODILATOR THERAPY AS APPROPRIATE  [x]   ASSESS BREATH SOUNDS  [x]   IMPLEMENT AEROSOL/MDI PROTOCOL  [x]   PATIENT EDUCATION AS NEEDED    MOBILIZE SECRETIONS    [x]   ASSESS BREATH SOUNDS  [x]   ASSESS SPUTUM PRODUCTION  [x]   COUGH AND DEEP BREATHING  [x]  IMPLEMENT SECRETION MANAGEMENT PROTOCOL  [x]   PATIENT EDUCATION AS NEEDED

## 2018-10-17 NOTE — H&P
NS and neurocritical care team  Resume AC and ASA  No signs or symptoms of infection, faith suspicion for PNA as well as PE as she was on [facility]  will repeat imaging  Will need home O2 eval  Continue breathing treatment  Continue other chronic home meds  PT/OT  Transfer to [facility] planning        Consultations:   IP CONSULT TO Camelia Martinez TO HOSPITALIST     Patient is admitted as inpatient status because of co-morbidities listed above, severity of signs and symptoms as outlined, requirement for current medical therapies and most importantly because of direct risk to patient if care not provided in a hospital setting.     Oral Sanchez MD  10/17/2018  4:18 PM    Copy sent to Dr. Holly Krishna MD

## 2018-10-18 VITALS
WEIGHT: 151.01 LBS | SYSTOLIC BLOOD PRESSURE: 127 MMHG | RESPIRATION RATE: 14 BRPM | HEIGHT: 64 IN | OXYGEN SATURATION: 93 % | BODY MASS INDEX: 25.78 KG/M2 | DIASTOLIC BLOOD PRESSURE: 62 MMHG | HEART RATE: 59 BPM | TEMPERATURE: 97.5 F

## 2018-10-18 PROCEDURE — 6360000002 HC RX W HCPCS: Performed by: FAMILY MEDICINE

## 2018-10-18 PROCEDURE — 92526 ORAL FUNCTION THERAPY: CPT

## 2018-10-18 PROCEDURE — 6360000002 HC RX W HCPCS: Performed by: EMERGENCY MEDICINE

## 2018-10-18 PROCEDURE — 2580000003 HC RX 258: Performed by: NURSE PRACTITIONER

## 2018-10-18 PROCEDURE — 99232 SBSQ HOSP IP/OBS MODERATE 35: CPT | Performed by: FAMILY MEDICINE

## 2018-10-18 PROCEDURE — 6370000000 HC RX 637 (ALT 250 FOR IP): Performed by: NURSE PRACTITIONER

## 2018-10-18 PROCEDURE — 92507 TX SP LANG VOICE COMM INDIV: CPT

## 2018-10-18 RX ORDER — FUROSEMIDE 10 MG/ML
20 INJECTION INTRAMUSCULAR; INTRAVENOUS ONCE
Status: COMPLETED | OUTPATIENT
Start: 2018-10-18 | End: 2018-10-18

## 2018-10-18 RX ADMIN — RISPERIDONE 0.25 MG: 0.25 TABLET, FILM COATED ORAL at 09:49

## 2018-10-18 RX ADMIN — FUROSEMIDE 20 MG: 10 INJECTION, SOLUTION INTRAMUSCULAR; INTRAVENOUS at 09:49

## 2018-10-18 RX ADMIN — Medication 10 ML: at 09:49

## 2018-10-18 RX ADMIN — MEMANTINE HYDROCHLORIDE 10 MG: 5 TABLET, FILM COATED ORAL at 09:49

## 2018-10-18 RX ADMIN — LEVOTHYROXINE SODIUM 125 MCG: 125 TABLET ORAL at 09:49

## 2018-10-18 RX ADMIN — Medication 81 MG: at 09:49

## 2018-10-18 RX ADMIN — LEVETIRACETAM 500 MG: 5 INJECTION INTRAVENOUS at 04:45

## 2018-10-18 ASSESSMENT — PAIN SCALES - GENERAL
PAINLEVEL_OUTOF10: 0
PAINLEVEL_OUTOF10: 0

## 2018-10-18 NOTE — DISCHARGE SUMMARY
significant stenoses. CAROTID ARTERIES: The common carotid arteries are normal in appearance without evidence of a flow limiting stenosis. The internal carotid arteries are normal in appearance without evidence of a flow limiting stenosis by NASCET criteria. No dissection or arterial injury is seen. Small amount of calcified plaque in the bulbs with no significant stenosis. VERTEBRAL ARTERIES: The vertebral arteries both arise from the subclavian arteries and are normal in caliber without evidence of flow limiting stenosis or dissection. SOFT TISSUES:  The lung apices demonstrate left upper lobe pleuroparenchymal opacity. No cervical or superior mediastinal lymphadenopathy. The visualized portion of the larynx and pharynx appear unremarkable. The parotid, submandibular and thyroid glands demonstrate no acute abnormality. BONES: The visualized osseous structures appear unremarkable. Left upper lobe airspace disease. Otherwise unremarkable CTA of the neck. RECOMMENDATIONS: Case discussed with Dr. Norm Macias at 6 p.m. Duane Bourdon Cta Head W Contrast    Result Date: 10/15/2018  EXAMINATION: CTA OF THE HEAD WITH CONTRAST  10/15/2018 5:36 pm: TECHNIQUE: CTA of the head/brain was performed with the administration of intravenous contrast. Multiplanar reformatted images are provided for review. MIP images are provided for review. Dose modulation, iterative reconstruction, and/or weight based adjustment of the mA/kV was utilized to reduce the radiation dose to as low as reasonably achievable. COMPARISON: CT head from today. HISTORY: ORDERING SYSTEM PROVIDED HISTORY: new left sided weakness, facial droop TECHNOLOGIST PROVIDED HISTORY: FINDINGS: ANTERIOR CIRCULATION: Calcified plaque noted in the cavernous segments of the internal carotid arteries bilaterally with no significant stenosis. The internal carotid arteries are normal in course and caliber without focal stenosis.  The anterior cerebral and middle cerebral arteries

## 2018-10-18 NOTE — PROGRESS NOTES
negative for abdominal pain, constipation, diarrhea, nausea, vomiting  Neurological:  negative for dizziness, headache    Medications: Allergies: Allergies   Allergen Reactions    Levaquin [Levofloxacin]     Penicillins        Current Meds:   Scheduled Meds:    polyethylene glycol  17 g Oral Nightly    levetiracetam  500 mg Intravenous Q12H    ipratropium-albuterol  1 vial Inhalation 4x daily    rivaroxaban  20 mg Oral Daily    aspirin  81 mg Oral Daily    docusate sodium  100 mg Oral BID    donepezil  10 mg Oral Nightly    levothyroxine  125 mcg Oral QAM    memantine  10 mg Oral BID    risperiDONE  0.25 mg Oral Daily    sodium chloride flush  10 mL Intravenous 2 times per day    sennosides-docusate sodium  2 tablet Oral Daily     Continuous Infusions:   PRN Meds: sodium chloride flush, magnesium hydroxide, ondansetron, acetaminophen, labetalol    Data:     Past Medical History:   has a past medical history of Anemia; Colon cancer ([facility] [address] [facility] Dementia; Gastric reflux; Hyperlipidemia; Hypothyroid; and Pneumonia. Social History:   reports that she has quit smoking. Her smoking use included Cigarettes. She has a 50.00 pack-year smoking history. She has never used smokeless tobacco. She reports that she does not drink alcohol or use drugs. Family History: History reviewed. No pertinent family history. Vitals:  /79   Pulse 66   Temp 98.1 °F (36.7 °C) (Oral)   Resp 20   Ht 5' 4\" (1.626 m)   Wt 151 lb 0.2 oz (68.5 kg)   SpO2 98%   BMI 25.92 kg/m²   Temp (24hrs), Av.2 °F (36.2 °C), Min:96.8 °F (36 °C), Max:98.1 °F (36.7 °C)    Recent Labs      10/15/18   1258  10/16/18   2045   POCGLU  110*  97       I/O (24Hr):     Intake/Output Summary (Last 24 hours) at 10/18/18 8628  Last data filed at 10/17/18 3587   Gross per 24 hour   Intake              453 ml   Output                0 ml   Net              453 ml unremarkable. The parotid, submandibular and thyroid glands demonstrate no acute abnormality. BONES: The visualized osseous structures appear unremarkable. Left upper lobe airspace disease. Otherwise unremarkable CTA of the neck. RECOMMENDATIONS: Case discussed with Dr. Rajendra Pedro at 6 p.m. Donn Briggs Cta Head W Contrast 10/15/2018   FINDINGS: ANTERIOR CIRCULATION: Calcified plaque noted in the cavernous segments of the internal carotid arteries bilaterally with no significant stenosis. The internal carotid arteries are normal in course and caliber without focal stenosis. The anterior cerebral and middle cerebral arteries demonstrate no focal stenosis. POSTERIOR CIRCULATION: The posterior cerebral arteries demonstrate no focal stenosis. The vertebral and basilar arteries appear unremarkable. BRAIN: Right pterional craniotomy is again noted with subjacent encephalomalacia or surgical bed. Coarse dystrophic intraparenchymal calcifications are also noted. Small hypodense extra-axial fluid collection again noted measuring up to 7 to 8 mm. Slight right to left midline shift again noted. Status post remote right pterional craniotomy and subjacent hygroma again noted. Otherwise unremarkable CTA of the head. RECOMMENDATIONS: The findings were sent to the Radiology Results [address] at 5:59 pm on 10/15/2018to be communicated to a licensed caregiver. Case discussed with Dr. Rajendra Pedro at 6 p.m. Donn Briggs Physical Examination:        General Appearance:  alert, well appearing, and in no acute distress  Mental status: not oriented  Head:  normocephalic, atraumatic.   Eye: no icterus, redness, pupils equal and reactive, extraocular eye movements intact, conjunctiva clear  Ear: normal external ear, no discharge, hearing intact  Nose:  no drainage noted, nasal canula  Mouth: mucous membranes moist  Neck: supple, no carotid bruits, thyroid not palpable  Lungs: Bilateral decreased air entry,  Scattered rhonchi, normal

## 2018-10-18 NOTE — PROGRESS NOTES
Speech Language Pathology  Speech Language Pathology  [facility]    Dysphagia / Speech Treatment Note    Date: 10/18/2018  Patients Name: Paul Carrillo  MRN: [id number]  Diagnosis:   Patient Active Problem List   Diagnosis Code    Subdural hematoma ([facility] S06.5X9A    Hygroma D18.1    Left hemiparesis ([facility] G81.94    Hypoxia R09.02    Hyperlipidemia E78.5    Dementia F03.90    Coronary artery disease involving native coronary artery of native heart without angina pectoris I25.10       Pain: 0/10    Speech and Language Treatment  Treatment time: 4351-3751    Subjective: [x] Alert [x] Cooperative     [] Confused     [] Agitated    [] Lethargic      Objective/Assessment:  Dysphagia: Pt seen for diet tolerance monitoring with breakfast tray. Pt with no s/s of aspiration with bites of soft solid, puree, and sips of thin liquid by cup and by straw. Pt provided education re: safe swallowing strategies and pt verbalized understanding. Speech: Pt provided written and verbal education re: dysarthria compensatory strategies. Pt utilized compensatory strategies during conversation in 60%, increased to 90% of opportunities with mod verbal and visual cues. Plan:  [x] Continue ST services    [] Discharge from ST:      Discharge recommendations: [] Inpatient Rehab   [x] Evaristo Seth   [] Outpatient Therapy  [] Follow up at trauma clinic   [] Other:       Treatment completed by:  Zoila Luis M.S. CF-SLP

## 2018-10-24 ENCOUNTER — HOSPITAL ENCOUNTER (OUTPATIENT)
Age: 83
Setting detail: SPECIMEN
Discharge: HOME | End: 2018-10-24
Payer: MEDICARE

## 2018-10-24 LAB
ANION GAP SERPL CALCULATED.3IONS-SCNC: 11 MMOL/L (ref 9–17)
BUN BLDV-MCNC: 17 MG/DL (ref 8–23)
BUN/CREAT BLD: ABNORMAL (ref 9–20)
CALCIUM SERPL-MCNC: 8.4 MG/DL (ref 8.6–10.4)
CHLORIDE BLD-SCNC: 104 MMOL/L (ref 98–107)
CO2: 27 MMOL/L (ref 20–31)
CREAT SERPL-MCNC: 0.67 MG/DL (ref 0.5–0.9)
GFR AFRICAN AMERICAN: >60 ML/MIN
GFR NON-AFRICAN AMERICAN: >60 ML/MIN
GFR SERPL CREATININE-BSD FRML MDRD: ABNORMAL ML/MIN/{1.73_M2}
GFR SERPL CREATININE-BSD FRML MDRD: ABNORMAL ML/MIN/{1.73_M2}
GLUCOSE BLD-MCNC: 71 MG/DL (ref 70–99)
HCT VFR BLD CALC: 40 % (ref 36.3–47.1)
HEMOGLOBIN: 12 G/DL (ref 11.9–15.1)
MCH RBC QN AUTO: 30.3 PG (ref 25.2–33.5)
MCHC RBC AUTO-ENTMCNC: 30 G/DL (ref 28.4–34.8)
MCV RBC AUTO: 101 FL (ref 82.6–102.9)
NRBC AUTOMATED: 0 PER 100 WBC
PDW BLD-RTO: 13.2 % (ref 11.8–14.4)
PLATELET # BLD: 242 K/UL (ref 138–453)
PMV BLD AUTO: 10.5 FL (ref 8.1–13.5)
POTASSIUM SERPL-SCNC: 4.6 MMOL/L (ref 3.7–5.3)
RBC # BLD: 3.96 M/UL (ref 3.95–5.11)
SODIUM BLD-SCNC: 142 MMOL/L (ref 135–144)
WBC # BLD: 7.4 K/UL (ref 3.5–11.3)

## 2018-10-24 PROCEDURE — 80048 BASIC METABOLIC PNL TOTAL CA: CPT

## 2018-10-24 PROCEDURE — 85027 COMPLETE CBC AUTOMATED: CPT

## 2018-10-24 PROCEDURE — P9603 ONE-WAY ALLOW PRORATED MILES: HCPCS

## 2018-10-24 PROCEDURE — 36415 COLL VENOUS BLD VENIPUNCTURE: CPT

## 2018-12-03 ENCOUNTER — HOSPITAL ENCOUNTER (OUTPATIENT)
Age: 83
Setting detail: SPECIMEN
Discharge: HOME | End: 2018-12-03
Payer: MEDICARE

## 2018-12-03 LAB
KEPPRA: 39 UG/ML
THYROXINE, FREE: 1.29 NG/DL (ref 0.93–1.7)
TSH SERPL DL<=0.05 MIU/L-ACNC: 1.83 MIU/L (ref 0.3–5)

## 2018-12-03 PROCEDURE — 84443 ASSAY THYROID STIM HORMONE: CPT

## 2018-12-03 PROCEDURE — 80177 DRUG SCRN QUAN LEVETIRACETAM: CPT

## 2018-12-03 PROCEDURE — 84439 ASSAY OF FREE THYROXINE: CPT

## 2018-12-03 PROCEDURE — 36415 COLL VENOUS BLD VENIPUNCTURE: CPT

## 2018-12-03 PROCEDURE — P9603 ONE-WAY ALLOW PRORATED MILES: HCPCS

## 2018-12-27 ENCOUNTER — HOSPITAL ENCOUNTER (OUTPATIENT)
Age: 83
Setting detail: SPECIMEN
Discharge: HOME | End: 2018-12-27
Payer: MEDICARE

## 2018-12-27 LAB
ANION GAP SERPL CALCULATED.3IONS-SCNC: 8 MMOL/L (ref 9–17)
BUN BLDV-MCNC: 12 MG/DL (ref 8–23)
BUN/CREAT BLD: ABNORMAL (ref 9–20)
CALCIUM SERPL-MCNC: 8.7 MG/DL (ref 8.6–10.4)
CHLORIDE BLD-SCNC: 104 MMOL/L (ref 98–107)
CO2: 28 MMOL/L (ref 20–31)
CREAT SERPL-MCNC: 0.73 MG/DL (ref 0.5–0.9)
GFR AFRICAN AMERICAN: >60 ML/MIN
GFR NON-AFRICAN AMERICAN: >60 ML/MIN
GFR SERPL CREATININE-BSD FRML MDRD: ABNORMAL ML/MIN/{1.73_M2}
GFR SERPL CREATININE-BSD FRML MDRD: ABNORMAL ML/MIN/{1.73_M2}
GLUCOSE BLD-MCNC: 77 MG/DL (ref 70–99)
POTASSIUM SERPL-SCNC: 4.1 MMOL/L (ref 3.7–5.3)
SODIUM BLD-SCNC: 140 MMOL/L (ref 135–144)

## 2018-12-27 PROCEDURE — 80048 BASIC METABOLIC PNL TOTAL CA: CPT

## 2018-12-27 PROCEDURE — 36415 COLL VENOUS BLD VENIPUNCTURE: CPT

## 2018-12-27 PROCEDURE — P9603 ONE-WAY ALLOW PRORATED MILES: HCPCS

## 2019-01-08 ENCOUNTER — OFFICE VISIT (OUTPATIENT)
Dept: NEUROLOGY | Age: 84
End: 2019-01-08
Payer: MEDICARE

## 2019-01-08 VITALS — DIASTOLIC BLOOD PRESSURE: 62 MMHG | HEART RATE: 57 BPM | SYSTOLIC BLOOD PRESSURE: 108 MMHG

## 2019-01-08 DIAGNOSIS — F03.90 DEMENTIA WITHOUT BEHAVIORAL DISTURBANCE, UNSPECIFIED DEMENTIA TYPE: ICD-10-CM

## 2019-01-08 DIAGNOSIS — G81.94 LEFT HEMIPARESIS (HCC): ICD-10-CM

## 2019-01-08 DIAGNOSIS — D18.1 HYGROMA: Primary | ICD-10-CM

## 2019-01-08 PROCEDURE — G8427 DOCREV CUR MEDS BY ELIG CLIN: HCPCS | Performed by: NURSE PRACTITIONER

## 2019-01-08 PROCEDURE — G8484 FLU IMMUNIZE NO ADMIN: HCPCS | Performed by: NURSE PRACTITIONER

## 2019-01-08 PROCEDURE — G8598 ASA/ANTIPLAT THER USED: HCPCS | Performed by: NURSE PRACTITIONER

## 2019-01-08 PROCEDURE — 1036F TOBACCO NON-USER: CPT | Performed by: NURSE PRACTITIONER

## 2019-01-08 PROCEDURE — G8419 CALC BMI OUT NRM PARAM NOF/U: HCPCS | Performed by: NURSE PRACTITIONER

## 2019-01-08 PROCEDURE — 99204 OFFICE O/P NEW MOD 45 MIN: CPT | Performed by: NURSE PRACTITIONER

## 2019-01-08 PROCEDURE — 1101F PT FALLS ASSESS-DOCD LE1/YR: CPT | Performed by: NURSE PRACTITIONER

## 2019-01-08 PROCEDURE — 1123F ACP DISCUSS/DSCN MKR DOCD: CPT | Performed by: NURSE PRACTITIONER

## 2019-01-08 PROCEDURE — 1090F PRES/ABSN URINE INCON ASSESS: CPT | Performed by: NURSE PRACTITIONER

## 2019-01-08 PROCEDURE — G8400 PT W/DXA NO RESULTS DOC: HCPCS | Performed by: NURSE PRACTITIONER

## 2019-01-08 PROCEDURE — 4040F PNEUMOC VAC/ADMIN/RCVD: CPT | Performed by: NURSE PRACTITIONER

## 2019-07-08 ENCOUNTER — OFFICE VISIT (OUTPATIENT)
Dept: NEUROLOGY | Age: 84
End: 2019-07-08
Payer: MEDICARE

## 2019-07-08 VITALS
DIASTOLIC BLOOD PRESSURE: 58 MMHG | HEART RATE: 77 BPM | SYSTOLIC BLOOD PRESSURE: 92 MMHG | WEIGHT: 153 LBS | HEIGHT: 63 IN | BODY MASS INDEX: 27.11 KG/M2

## 2019-07-08 DIAGNOSIS — F02.80 DEMENTIA ASSOCIATED WITH OTHER UNDERLYING DISEASE WITHOUT BEHAVIORAL DISTURBANCE (HCC): Primary | ICD-10-CM

## 2019-07-08 DIAGNOSIS — Z87.828 HISTORY OF GUNSHOT WOUND: ICD-10-CM

## 2019-07-08 DIAGNOSIS — G81.94 LEFT HEMIPARESIS (HCC): ICD-10-CM

## 2019-07-08 PROCEDURE — 4040F PNEUMOC VAC/ADMIN/RCVD: CPT | Performed by: NURSE PRACTITIONER

## 2019-07-08 PROCEDURE — 1090F PRES/ABSN URINE INCON ASSESS: CPT | Performed by: NURSE PRACTITIONER

## 2019-07-08 PROCEDURE — G8427 DOCREV CUR MEDS BY ELIG CLIN: HCPCS | Performed by: NURSE PRACTITIONER

## 2019-07-08 PROCEDURE — 1123F ACP DISCUSS/DSCN MKR DOCD: CPT | Performed by: NURSE PRACTITIONER

## 2019-07-08 PROCEDURE — G8419 CALC BMI OUT NRM PARAM NOF/U: HCPCS | Performed by: NURSE PRACTITIONER

## 2019-07-08 PROCEDURE — G8598 ASA/ANTIPLAT THER USED: HCPCS | Performed by: NURSE PRACTITIONER

## 2019-07-08 PROCEDURE — G8400 PT W/DXA NO RESULTS DOC: HCPCS | Performed by: NURSE PRACTITIONER

## 2019-07-08 PROCEDURE — 99213 OFFICE O/P EST LOW 20 MIN: CPT | Performed by: NURSE PRACTITIONER

## 2019-07-08 PROCEDURE — 1036F TOBACCO NON-USER: CPT | Performed by: NURSE PRACTITIONER

## 2019-07-08 RX ORDER — LORAZEPAM 1 MG/1
1 TABLET ORAL EVERY 6 HOURS PRN
COMMUNITY

## 2020-01-01 ENCOUNTER — HOSPITAL ENCOUNTER (OUTPATIENT)
Dept: CT IMAGING | Age: 85
Discharge: HOME | End: 2020-06-13
Payer: MEDICARE

## 2020-01-01 ENCOUNTER — HOSPITAL ENCOUNTER (OUTPATIENT)
Age: 85
Setting detail: SPECIMEN
Discharge: HOME | End: 2020-07-14
Payer: MEDICARE

## 2020-01-01 ENCOUNTER — HOSPITAL ENCOUNTER (OUTPATIENT)
Age: 85
Setting detail: SPECIMEN
Discharge: HOME | End: 2020-08-11
Payer: MEDICARE

## 2020-01-01 ENCOUNTER — OFFICE VISIT (OUTPATIENT)
Dept: NEUROLOGY | Age: 85
End: 2020-01-01
Payer: MEDICARE

## 2020-01-01 ENCOUNTER — HOSPITAL ENCOUNTER (OUTPATIENT)
Age: 85
Setting detail: SPECIMEN
Discharge: HOME | End: 2020-06-02
Payer: MEDICARE

## 2020-01-01 ENCOUNTER — HOSPITAL ENCOUNTER (OUTPATIENT)
Age: 85
Setting detail: SPECIMEN
Discharge: HOME | End: 2020-05-30
Payer: MEDICARE

## 2020-01-01 VITALS
SYSTOLIC BLOOD PRESSURE: 100 MMHG | TEMPERATURE: 97.2 F | DIASTOLIC BLOOD PRESSURE: 60 MMHG | OXYGEN SATURATION: 92 % | HEART RATE: 64 BPM

## 2020-01-01 LAB
BUN BLDV-MCNC: 20 MG/DL (ref 8–23)
CREAT SERPL-MCNC: 0.64 MG/DL (ref 0.5–0.9)
GFR AFRICAN AMERICAN: >60 ML/MIN
GFR NON-AFRICAN AMERICAN: >60 ML/MIN
GFR SERPL CREATININE-BSD FRML MDRD: NORMAL ML/MIN/{1.73_M2}
GFR SERPL CREATININE-BSD FRML MDRD: NORMAL ML/MIN/{1.73_M2}
TSH SERPL DL<=0.05 MIU/L-ACNC: 11.24 MIU/L (ref 0.3–5)
TSH SERPL DL<=0.05 MIU/L-ACNC: 3.75 MIU/L (ref 0.3–5)

## 2020-01-01 PROCEDURE — P9603 ONE-WAY ALLOW PRORATED MILES: HCPCS

## 2020-01-01 PROCEDURE — 84443 ASSAY THYROID STIM HORMONE: CPT

## 2020-01-01 PROCEDURE — 4040F PNEUMOC VAC/ADMIN/RCVD: CPT | Performed by: NURSE PRACTITIONER

## 2020-01-01 PROCEDURE — 82565 ASSAY OF CREATININE: CPT

## 2020-01-01 PROCEDURE — G8400 PT W/DXA NO RESULTS DOC: HCPCS | Performed by: NURSE PRACTITIONER

## 2020-01-01 PROCEDURE — 1123F ACP DISCUSS/DSCN MKR DOCD: CPT | Performed by: NURSE PRACTITIONER

## 2020-01-01 PROCEDURE — 1036F TOBACCO NON-USER: CPT | Performed by: NURSE PRACTITIONER

## 2020-01-01 PROCEDURE — 36415 COLL VENOUS BLD VENIPUNCTURE: CPT

## 2020-01-01 PROCEDURE — 71260 CT THORAX DX C+: CPT

## 2020-01-01 PROCEDURE — 6360000004 HC RX CONTRAST MEDICATION: Performed by: FAMILY MEDICINE

## 2020-01-01 PROCEDURE — 99214 OFFICE O/P EST MOD 30 MIN: CPT | Performed by: NURSE PRACTITIONER

## 2020-01-01 PROCEDURE — 1090F PRES/ABSN URINE INCON ASSESS: CPT | Performed by: NURSE PRACTITIONER

## 2020-01-01 PROCEDURE — 2580000003 HC RX 258: Performed by: FAMILY MEDICINE

## 2020-01-01 PROCEDURE — G8484 FLU IMMUNIZE NO ADMIN: HCPCS | Performed by: NURSE PRACTITIONER

## 2020-01-01 PROCEDURE — 84520 ASSAY OF UREA NITROGEN: CPT

## 2020-01-01 PROCEDURE — G8421 BMI NOT CALCULATED: HCPCS | Performed by: NURSE PRACTITIONER

## 2020-01-01 PROCEDURE — G8427 DOCREV CUR MEDS BY ELIG CLIN: HCPCS | Performed by: NURSE PRACTITIONER

## 2020-01-01 RX ORDER — ATORVASTATIN CALCIUM 10 MG/1
TABLET, FILM COATED ORAL
COMMUNITY

## 2020-01-01 RX ORDER — WARFARIN SODIUM 3 MG/1
TABLET ORAL
COMMUNITY

## 2020-01-01 RX ORDER — SODIUM CHLORIDE 0.9 % (FLUSH) 0.9 %
10 SYRINGE (ML) INJECTION PRN
Status: DISCONTINUED | OUTPATIENT
Start: 2020-01-01 | End: 2020-01-01 | Stop reason: HOSPADM

## 2020-01-01 RX ORDER — ASCORBIC ACID 100 MG
TABLET,CHEWABLE ORAL
COMMUNITY

## 2020-01-01 RX ORDER — 0.9 % SODIUM CHLORIDE 0.9 %
80 INTRAVENOUS SOLUTION INTRAVENOUS ONCE
Status: COMPLETED | OUTPATIENT
Start: 2020-01-01 | End: 2020-01-01

## 2020-01-01 RX ADMIN — IOPAMIDOL 75 ML: 755 INJECTION, SOLUTION INTRAVENOUS at 15:09

## 2020-01-01 RX ADMIN — Medication 10 ML: at 15:10

## 2020-01-01 RX ADMIN — SODIUM CHLORIDE 80 ML: 9 INJECTION, SOLUTION INTRAVENOUS at 15:09
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
## 2021-01-01 ENCOUNTER — APPOINTMENT (OUTPATIENT)
Dept: GENERAL RADIOLOGY | Age: 86
DRG: 283 | End: 2021-01-01
Payer: MEDICARE

## 2021-01-01 ENCOUNTER — HOSPITAL ENCOUNTER (INPATIENT)
Age: 86
LOS: 1 days | DRG: 283 | End: 2021-02-28
Attending: EMERGENCY MEDICINE | Admitting: INTERNAL MEDICINE
Payer: MEDICARE

## 2021-01-01 ENCOUNTER — APPOINTMENT (OUTPATIENT)
Dept: CT IMAGING | Age: 86
DRG: 283 | End: 2021-01-01
Payer: MEDICARE

## 2021-01-01 ENCOUNTER — HOSPITAL ENCOUNTER (OUTPATIENT)
Age: 86
Setting detail: SPECIMEN
Discharge: HOME | End: 2021-01-06
Payer: MEDICARE

## 2021-01-01 VITALS
RESPIRATION RATE: 37 BRPM | HEIGHT: 64 IN | TEMPERATURE: 94.5 F | DIASTOLIC BLOOD PRESSURE: 78 MMHG | OXYGEN SATURATION: 65 % | WEIGHT: 165 LBS | BODY MASS INDEX: 28.17 KG/M2 | SYSTOLIC BLOOD PRESSURE: 138 MMHG | HEART RATE: 63 BPM

## 2021-01-01 DIAGNOSIS — I46.9 CARDIAC ARREST (HCC): Primary | ICD-10-CM

## 2021-01-01 LAB
ABSOLUTE EOS #: 0 K/UL (ref 0–0.4)
ABSOLUTE EOS #: 0.48 K/UL (ref 0–0.4)
ABSOLUTE IMMATURE GRANULOCYTE: 0.32 K/UL (ref 0–0.3)
ABSOLUTE IMMATURE GRANULOCYTE: 0.99 K/UL (ref 0–0.3)
ABSOLUTE LYMPH #: 2.56 K/UL (ref 1–4.8)
ABSOLUTE LYMPH #: 4.16 K/UL (ref 1–4.8)
ABSOLUTE MONO #: 0.39 K/UL (ref 0.1–0.8)
ABSOLUTE MONO #: 0.48 K/UL (ref 0.1–0.8)
ALBUMIN SERPL-MCNC: 2.4 G/DL (ref 3.5–5.2)
ALBUMIN/GLOBULIN RATIO: 0.7 (ref 1–2.5)
ALLEN TEST: ABNORMAL
ALP BLD-CCNC: 117 U/L (ref 35–104)
ALT SERPL-CCNC: 173 U/L (ref 5–33)
ANION GAP SERPL CALCULATED.3IONS-SCNC: 21 MMOL/L (ref 9–17)
ANION GAP SERPL CALCULATED.3IONS-SCNC: 30 MMOL/L (ref 9–17)
ANION GAP: 18 MMOL/L (ref 7–16)
AST SERPL-CCNC: 312 U/L
BASOPHILS # BLD: 0 % (ref 0–2)
BASOPHILS # BLD: 1 % (ref 0–2)
BASOPHILS ABSOLUTE: 0 K/UL (ref 0–0.2)
BASOPHILS ABSOLUTE: 0.2 K/UL (ref 0–0.2)
BILIRUB SERPL-MCNC: 0.16 MG/DL (ref 0.3–1.2)
BNP INTERPRETATION: ABNORMAL
BUN BLDV-MCNC: 18 MG/DL (ref 8–23)
BUN BLDV-MCNC: 20 MG/DL (ref 8–23)
BUN/CREAT BLD: ABNORMAL (ref 9–20)
BUN/CREAT BLD: ABNORMAL (ref 9–20)
CALCIUM IONIZED: 1 MMOL/L (ref 1.13–1.33)
CALCIUM SERPL-MCNC: 7.2 MG/DL (ref 8.6–10.4)
CALCIUM SERPL-MCNC: 8.8 MG/DL (ref 8.6–10.4)
CARBOXYHEMOGLOBIN: 0.7 % (ref 0–5)
CHLORIDE BLD-SCNC: 106 MMOL/L (ref 98–107)
CHLORIDE BLD-SCNC: 99 MMOL/L (ref 98–107)
CO2: 14 MMOL/L (ref 20–31)
CO2: 17 MMOL/L (ref 20–31)
CREAT SERPL-MCNC: 0.74 MG/DL (ref 0.5–0.9)
CREAT SERPL-MCNC: 0.8 MG/DL (ref 0.5–0.9)
DIFFERENTIAL TYPE: ABNORMAL
DIFFERENTIAL TYPE: ABNORMAL
EOSINOPHILS RELATIVE PERCENT: 0 % (ref 1–4)
EOSINOPHILS RELATIVE PERCENT: 3 % (ref 1–4)
FIO2: 100
FIO2: ABNORMAL
FIO2: ABNORMAL
GFR AFRICAN AMERICAN: >60 ML/MIN
GFR AFRICAN AMERICAN: >60 ML/MIN
GFR NON-AFRICAN AMERICAN: 60 ML/MIN
GFR NON-AFRICAN AMERICAN: >60 ML/MIN
GFR NON-AFRICAN AMERICAN: >60 ML/MIN
GFR SERPL CREATININE-BSD FRML MDRD: >60 ML/MIN
GFR SERPL CREATININE-BSD FRML MDRD: ABNORMAL ML/MIN/{1.73_M2}
GLUCOSE BLD-MCNC: 226 MG/DL (ref 65–105)
GLUCOSE BLD-MCNC: 252 MG/DL (ref 70–99)
GLUCOSE BLD-MCNC: 279 MG/DL (ref 74–100)
GLUCOSE BLD-MCNC: 308 MG/DL (ref 70–99)
GLUCOSE BLD-MCNC: 312 MG/DL (ref 74–100)
HCO3 VENOUS: 13.6 MMOL/L (ref 24–30)
HCO3 VENOUS: 20.4 MMOL/L (ref 22–29)
HCT VFR BLD CALC: 36.6 % (ref 36.3–47.1)
HCT VFR BLD CALC: 44.3 % (ref 36.3–47.1)
HEMOGLOBIN: 10.5 G/DL (ref 11.9–15.1)
HEMOGLOBIN: 12.4 G/DL (ref 11.9–15.1)
IMMATURE GRANULOCYTES: 2 %
IMMATURE GRANULOCYTES: 5 %
INR BLD: 1.1
LACTIC ACID, SEPSIS WHOLE BLOOD: 12.9 MMOL/L (ref 0.5–1.9)
LACTIC ACID, SEPSIS: ABNORMAL MMOL/L (ref 0.5–1.9)
LACTIC ACID, WHOLE BLOOD: 18 MMOL/L (ref 0.7–2.1)
LACTIC ACID: ABNORMAL MMOL/L
LYMPHOCYTES # BLD: 13 % (ref 24–44)
LYMPHOCYTES # BLD: 26 % (ref 24–44)
MAGNESIUM: 2.9 MG/DL (ref 1.6–2.6)
MCH RBC QN AUTO: 30.5 PG (ref 25.2–33.5)
MCH RBC QN AUTO: 31.1 PG (ref 25.2–33.5)
MCHC RBC AUTO-ENTMCNC: 28 G/DL (ref 28.4–34.8)
MCHC RBC AUTO-ENTMCNC: 28.7 G/DL (ref 28.4–34.8)
MCV RBC AUTO: 108.3 FL (ref 82.6–102.9)
MCV RBC AUTO: 109.1 FL (ref 82.6–102.9)
METHEMOGLOBIN: ABNORMAL % (ref 0–1.5)
MODE: ABNORMAL
MONOCYTES # BLD: 2 % (ref 1–7)
MONOCYTES # BLD: 3 % (ref 1–7)
MORPHOLOGY: ABNORMAL
NEGATIVE BASE EXCESS, ART: 9 (ref 0–2)
NEGATIVE BASE EXCESS, VEN: 13 (ref 0–2)
NEGATIVE BASE EXCESS, VEN: 13.9 MMOL/L (ref 0–2)
NOTIFICATION TIME: ABNORMAL
NOTIFICATION: ABNORMAL
NRBC AUTOMATED: 0 PER 100 WBC
NRBC AUTOMATED: 0.1 PER 100 WBC
O2 DEVICE/FLOW/%: ABNORMAL
O2 SAT, VEN: 77 % (ref 60–85)
O2 SAT, VEN: 99.1 % (ref 60–85)
OXYHEMOGLOBIN: ABNORMAL % (ref 95–98)
PARTIAL THROMBOPLASTIN TIME: 24.8 SEC (ref 20.5–30.5)
PARTIAL THROMBOPLASTIN TIME: 29.7 SEC (ref 20.5–30.5)
PATIENT TEMP: 37
PATIENT TEMP: ABNORMAL
PATIENT TEMP: ABNORMAL
PCO2, VEN, TEMP ADJ: ABNORMAL MMHG (ref 39–55)
PCO2, VEN: 39.2 (ref 39–55)
PCO2, VEN: 89.9 MM HG (ref 41–51)
PDW BLD-RTO: 14.6 % (ref 11.8–14.4)
PDW BLD-RTO: 14.9 % (ref 11.8–14.4)
PEEP/CPAP: ABNORMAL
PH VENOUS: 6.96 (ref 7.32–7.43)
PH VENOUS: 7.17 (ref 7.32–7.42)
PH, VEN, TEMP ADJ: ABNORMAL (ref 7.32–7.42)
PLATELET # BLD: 197 K/UL (ref 138–453)
PLATELET # BLD: 217 K/UL (ref 138–453)
PLATELET ESTIMATE: ABNORMAL
PLATELET ESTIMATE: ABNORMAL
PMV BLD AUTO: 10.7 FL (ref 8.1–13.5)
PMV BLD AUTO: 11.3 FL (ref 8.1–13.5)
PO2, VEN, TEMP ADJ: ABNORMAL MMHG (ref 30–50)
PO2, VEN: 269 (ref 30–50)
PO2, VEN: 66 MM HG (ref 30–50)
POC CHLORIDE: 105 MMOL/L (ref 98–107)
POC CREATININE: 0.9 MG/DL (ref 0.51–1.19)
POC HCO3: 17 MMOL/L (ref 21–28)
POC HEMATOCRIT: 43 % (ref 36–46)
POC HEMOGLOBIN: 14.6 G/DL (ref 12–16)
POC IONIZED CALCIUM: 1.27 MMOL/L (ref 1.15–1.33)
POC LACTIC ACID: 11.47 MMOL/L (ref 0.56–1.39)
POC LACTIC ACID: 16.69 MMOL/L (ref 0.56–1.39)
POC O2 SATURATION: 96 % (ref 94–98)
POC PCO2 TEMP: ABNORMAL MM HG
POC PCO2 TEMP: ABNORMAL MM HG
POC PCO2: 37.7 MM HG (ref 35–48)
POC PH TEMP: ABNORMAL
POC PH TEMP: ABNORMAL
POC PH: 7.26 (ref 7.35–7.45)
POC PO2 TEMP: ABNORMAL MM HG
POC PO2 TEMP: ABNORMAL MM HG
POC PO2: 93.8 MM HG (ref 83–108)
POC POTASSIUM: 4.6 MMOL/L (ref 3.5–4.5)
POC SODIUM: 143 MMOL/L (ref 138–146)
POSITIVE BASE EXCESS, ART: ABNORMAL (ref 0–3)
POSITIVE BASE EXCESS, VEN: ABNORMAL (ref 0–3)
POSITIVE BASE EXCESS, VEN: ABNORMAL MMOL/L (ref 0–2)
POTASSIUM SERPL-SCNC: 4 MMOL/L (ref 3.7–5.3)
POTASSIUM SERPL-SCNC: 4.5 MMOL/L (ref 3.7–5.3)
PRO-BNP: 906 PG/ML
PROTHROMBIN TIME: 11.4 SEC (ref 9.1–12.3)
PSV: ABNORMAL
PT. POSITION: ABNORMAL
RBC # BLD: 3.38 M/UL (ref 3.95–5.11)
RBC # BLD: 4.06 M/UL (ref 3.95–5.11)
RBC # BLD: ABNORMAL 10*6/UL
RBC # BLD: ABNORMAL 10*6/UL
RESPIRATORY RATE: ABNORMAL
SAMPLE SITE: ABNORMAL
SARS-COV-2, RAPID: NOT DETECTED
SEG NEUTROPHILS: 66 % (ref 36–66)
SEG NEUTROPHILS: 79 % (ref 36–66)
SEGMENTED NEUTROPHILS ABSOLUTE COUNT: 10.56 K/UL (ref 1.8–7.7)
SEGMENTED NEUTROPHILS ABSOLUTE COUNT: 15.56 K/UL (ref 1.8–7.7)
SET RATE: ABNORMAL
SODIUM BLD-SCNC: 137 MMOL/L (ref 135–144)
SODIUM BLD-SCNC: 150 MMOL/L (ref 135–144)
SPECIMEN DESCRIPTION: NORMAL
TCO2 (CALC), ART: 18 MMOL/L (ref 22–29)
TEXT FOR RESPIRATORY: ABNORMAL
TOTAL CO2, VENOUS: 23 MMOL/L (ref 23–30)
TOTAL HB: ABNORMAL G/DL (ref 12–16)
TOTAL PROTEIN: 5.8 G/DL (ref 6.4–8.3)
TOTAL RATE: ABNORMAL
TROPONIN INTERP: ABNORMAL
TROPONIN INTERP: ABNORMAL
TROPONIN T: ABNORMAL NG/ML
TROPONIN T: ABNORMAL NG/ML
TROPONIN, HIGH SENSITIVITY: 26 NG/L (ref 0–14)
TROPONIN, HIGH SENSITIVITY: 262 NG/L (ref 0–14)
TSH SERPL DL<=0.05 MIU/L-ACNC: 0.38 MIU/L (ref 0.3–5)
VT: ABNORMAL
WBC # BLD: 16 K/UL (ref 3.5–11.3)
WBC # BLD: 19.7 K/UL (ref 3.5–11.3)
WBC # BLD: ABNORMAL 10*3/UL
WBC # BLD: ABNORMAL 10*3/UL

## 2021-01-01 PROCEDURE — 6360000002 HC RX W HCPCS

## 2021-01-01 PROCEDURE — 36415 COLL VENOUS BLD VENIPUNCTURE: CPT

## 2021-01-01 PROCEDURE — 84443 ASSAY THYROID STIM HORMONE: CPT

## 2021-01-01 PROCEDURE — P9603 ONE-WAY ALLOW PRORATED MILES: HCPCS

## 2021-01-01 PROCEDURE — 99291 CRITICAL CARE FIRST HOUR: CPT | Performed by: INTERNAL MEDICINE

## 2021-01-01 PROCEDURE — 2580000003 HC RX 258

## 2021-01-01 PROCEDURE — 74018 RADEX ABDOMEN 1 VIEW: CPT

## 2021-01-01 PROCEDURE — 85730 THROMBOPLASTIN TIME PARTIAL: CPT

## 2021-01-01 PROCEDURE — 6360000002 HC RX W HCPCS: Performed by: EMERGENCY MEDICINE

## 2021-01-01 PROCEDURE — 2000000000 HC ICU R&B

## 2021-01-01 PROCEDURE — 85610 PROTHROMBIN TIME: CPT

## 2021-01-01 PROCEDURE — 93005 ELECTROCARDIOGRAM TRACING: CPT | Performed by: STUDENT IN AN ORGANIZED HEALTH CARE EDUCATION/TRAINING PROGRAM

## 2021-01-01 PROCEDURE — 36556 INSERT NON-TUNNEL CV CATH: CPT

## 2021-01-01 PROCEDURE — 83605 ASSAY OF LACTIC ACID: CPT

## 2021-01-01 PROCEDURE — 92950 HEART/LUNG RESUSCITATION CPR: CPT

## 2021-01-01 PROCEDURE — 82803 BLOOD GASES ANY COMBINATION: CPT

## 2021-01-01 PROCEDURE — 94761 N-INVAS EAR/PLS OXIMETRY MLT: CPT

## 2021-01-01 PROCEDURE — 2580000003 HC RX 258: Performed by: STUDENT IN AN ORGANIZED HEALTH CARE EDUCATION/TRAINING PROGRAM

## 2021-01-01 PROCEDURE — 6370000000 HC RX 637 (ALT 250 FOR IP)

## 2021-01-01 PROCEDURE — 82435 ASSAY OF BLOOD CHLORIDE: CPT

## 2021-01-01 PROCEDURE — 99291 CRITICAL CARE FIRST HOUR: CPT

## 2021-01-01 PROCEDURE — 82565 ASSAY OF CREATININE: CPT

## 2021-01-01 PROCEDURE — 84295 ASSAY OF SERUM SODIUM: CPT

## 2021-01-01 PROCEDURE — 83735 ASSAY OF MAGNESIUM: CPT

## 2021-01-01 PROCEDURE — 02HV33Z INSERTION OF INFUSION DEVICE INTO SUPERIOR VENA CAVA, PERCUTANEOUS APPROACH: ICD-10-PCS | Performed by: INTERNAL MEDICINE

## 2021-01-01 PROCEDURE — 70450 CT HEAD/BRAIN W/O DYE: CPT

## 2021-01-01 PROCEDURE — 82330 ASSAY OF CALCIUM: CPT

## 2021-01-01 PROCEDURE — 71045 X-RAY EXAM CHEST 1 VIEW: CPT

## 2021-01-01 PROCEDURE — 84484 ASSAY OF TROPONIN QUANT: CPT

## 2021-01-01 PROCEDURE — 80053 COMPREHEN METABOLIC PANEL: CPT

## 2021-01-01 PROCEDURE — 37799 UNLISTED PX VASCULAR SURGERY: CPT

## 2021-01-01 PROCEDURE — 85025 COMPLETE CBC W/AUTO DIFF WBC: CPT

## 2021-01-01 PROCEDURE — 84132 ASSAY OF SERUM POTASSIUM: CPT

## 2021-01-01 PROCEDURE — 5A1935Z RESPIRATORY VENTILATION, LESS THAN 24 CONSECUTIVE HOURS: ICD-10-PCS | Performed by: INTERNAL MEDICINE

## 2021-01-01 PROCEDURE — 2500000003 HC RX 250 WO HCPCS: Performed by: STUDENT IN AN ORGANIZED HEALTH CARE EDUCATION/TRAINING PROGRAM

## 2021-01-01 PROCEDURE — 6360000002 HC RX W HCPCS: Performed by: STUDENT IN AN ORGANIZED HEALTH CARE EDUCATION/TRAINING PROGRAM

## 2021-01-01 PROCEDURE — 5A12012 PERFORMANCE OF CARDIAC OUTPUT, SINGLE, MANUAL: ICD-10-PCS | Performed by: EMERGENCY MEDICINE

## 2021-01-01 PROCEDURE — 93005 ELECTROCARDIOGRAM TRACING: CPT | Performed by: EMERGENCY MEDICINE

## 2021-01-01 PROCEDURE — 94002 VENT MGMT INPAT INIT DAY: CPT

## 2021-01-01 PROCEDURE — 82947 ASSAY GLUCOSE BLOOD QUANT: CPT

## 2021-01-01 PROCEDURE — 85014 HEMATOCRIT: CPT

## 2021-01-01 PROCEDURE — U0002 COVID-19 LAB TEST NON-CDC: HCPCS

## 2021-01-01 PROCEDURE — 87641 MR-STAPH DNA AMP PROBE: CPT

## 2021-01-01 PROCEDURE — 80048 BASIC METABOLIC PNL TOTAL CA: CPT

## 2021-01-01 PROCEDURE — 2500000003 HC RX 250 WO HCPCS

## 2021-01-01 PROCEDURE — 83880 ASSAY OF NATRIURETIC PEPTIDE: CPT

## 2021-01-01 PROCEDURE — 2700000000 HC OXYGEN THERAPY PER DAY

## 2021-01-01 PROCEDURE — 82805 BLOOD GASES W/O2 SATURATION: CPT

## 2021-01-01 RX ORDER — HEPARIN SODIUM 1000 [USP'U]/ML
2000 INJECTION, SOLUTION INTRAVENOUS; SUBCUTANEOUS PRN
Status: DISCONTINUED | OUTPATIENT
Start: 2021-01-01 | End: 2021-01-01 | Stop reason: HOSPADM

## 2021-01-01 RX ORDER — HEPARIN SODIUM 10000 [USP'U]/100ML
INJECTION, SOLUTION INTRAVENOUS
Status: DISCONTINUED
Start: 2021-01-01 | End: 2021-01-01 | Stop reason: HOSPADM

## 2021-01-01 RX ORDER — LEVETIRACETAM 15 MG/ML
1500 INJECTION INTRAVASCULAR EVERY 12 HOURS
Status: DISCONTINUED | OUTPATIENT
Start: 2021-01-01 | End: 2021-01-01 | Stop reason: HOSPADM

## 2021-01-01 RX ORDER — NOREPINEPHRINE BIT/0.9 % NACL 16MG/250ML
INFUSION BOTTLE (ML) INTRAVENOUS
Status: COMPLETED
Start: 2021-01-01 | End: 2021-01-01

## 2021-01-01 RX ORDER — SODIUM CHLORIDE 0.9 % (FLUSH) 0.9 %
10 SYRINGE (ML) INJECTION EVERY 12 HOURS SCHEDULED
Status: DISCONTINUED | OUTPATIENT
Start: 2021-01-01 | End: 2021-01-01 | Stop reason: HOSPADM

## 2021-01-01 RX ORDER — POLYETHYLENE GLYCOL 3350 17 G/17G
17 POWDER, FOR SOLUTION ORAL DAILY PRN
Status: DISCONTINUED | OUTPATIENT
Start: 2021-01-01 | End: 2021-01-01 | Stop reason: HOSPADM

## 2021-01-01 RX ORDER — ACETAMINOPHEN 650 MG/1
650 SUPPOSITORY RECTAL EVERY 6 HOURS PRN
Status: DISCONTINUED | OUTPATIENT
Start: 2021-01-01 | End: 2021-01-01 | Stop reason: HOSPADM

## 2021-01-01 RX ORDER — PROMETHAZINE HYDROCHLORIDE 12.5 MG/1
12.5 TABLET ORAL EVERY 6 HOURS PRN
Status: DISCONTINUED | OUTPATIENT
Start: 2021-01-01 | End: 2021-01-01 | Stop reason: HOSPADM

## 2021-01-01 RX ORDER — ASPIRIN 300 MG/1
SUPPOSITORY RECTAL
Status: COMPLETED
Start: 2021-01-01 | End: 2021-01-01

## 2021-01-01 RX ORDER — MAGNESIUM SULFATE 1 G/100ML
1000 INJECTION INTRAVENOUS ONCE
Status: COMPLETED | OUTPATIENT
Start: 2021-01-01 | End: 2021-01-01

## 2021-01-01 RX ORDER — ACETAMINOPHEN 325 MG/1
650 TABLET ORAL EVERY 6 HOURS PRN
Status: DISCONTINUED | OUTPATIENT
Start: 2021-01-01 | End: 2021-01-01 | Stop reason: HOSPADM

## 2021-01-01 RX ORDER — HEPARIN SODIUM 10000 [USP'U]/100ML
5-30 INJECTION, SOLUTION INTRAVENOUS CONTINUOUS
Status: DISCONTINUED | OUTPATIENT
Start: 2021-01-01 | End: 2021-01-01 | Stop reason: HOSPADM

## 2021-01-01 RX ORDER — EPINEPHRINE 0.1 MG/ML
SYRINGE (ML) INJECTION DAILY PRN
Status: COMPLETED | OUTPATIENT
Start: 2021-01-01 | End: 2021-01-01

## 2021-01-01 RX ORDER — CHLORHEXIDINE GLUCONATE 0.12 MG/ML
15 RINSE ORAL 2 TIMES DAILY
Status: DISCONTINUED | OUTPATIENT
Start: 2021-01-01 | End: 2021-01-01 | Stop reason: HOSPADM

## 2021-01-01 RX ORDER — HEPARIN SODIUM 1000 [USP'U]/ML
4000 INJECTION, SOLUTION INTRAVENOUS; SUBCUTANEOUS ONCE
Status: DISCONTINUED | OUTPATIENT
Start: 2021-01-01 | End: 2021-01-01 | Stop reason: HOSPADM

## 2021-01-01 RX ORDER — SODIUM CHLORIDE 0.9 % (FLUSH) 0.9 %
10 SYRINGE (ML) INJECTION PRN
Status: DISCONTINUED | OUTPATIENT
Start: 2021-01-01 | End: 2021-01-01 | Stop reason: HOSPADM

## 2021-01-01 RX ORDER — HEPARIN SODIUM 1000 [USP'U]/ML
4000 INJECTION, SOLUTION INTRAVENOUS; SUBCUTANEOUS PRN
Status: DISCONTINUED | OUTPATIENT
Start: 2021-01-01 | End: 2021-01-01 | Stop reason: HOSPADM

## 2021-01-01 RX ORDER — EPINEPHRINE 0.1 MG/ML
SYRINGE (ML) INJECTION
Status: DISCONTINUED
Start: 2021-01-01 | End: 2021-01-01 | Stop reason: HOSPADM

## 2021-01-01 RX ORDER — NOREPINEPHRINE BIT/0.9 % NACL 16MG/250ML
2-100 INFUSION BOTTLE (ML) INTRAVENOUS CONTINUOUS
Status: DISCONTINUED | OUTPATIENT
Start: 2021-01-01 | End: 2021-01-01 | Stop reason: HOSPADM

## 2021-01-01 RX ORDER — ONDANSETRON 2 MG/ML
4 INJECTION INTRAMUSCULAR; INTRAVENOUS EVERY 6 HOURS PRN
Status: DISCONTINUED | OUTPATIENT
Start: 2021-01-01 | End: 2021-01-01 | Stop reason: HOSPADM

## 2021-01-01 RX ADMIN — Medication 30 MCG/MIN: at 08:34

## 2021-01-01 RX ADMIN — PHENYLEPHRINE HYDROCHLORIDE 100 MCG/MIN: 10 INJECTION INTRAVENOUS at 10:06

## 2021-01-01 RX ADMIN — MAGNESIUM SULFATE HEPTAHYDRATE 1000 MG: 1 INJECTION, SOLUTION INTRAVENOUS at 09:58

## 2021-01-01 RX ADMIN — AMIODARONE HYDROCHLORIDE 150 MG: 50 INJECTION, SOLUTION INTRAVENOUS at 10:22

## 2021-01-01 RX ADMIN — SODIUM BICARBONATE 50 MEQ: 84 INJECTION, SOLUTION INTRAVENOUS at 09:54

## 2021-01-01 RX ADMIN — EPINEPHRINE 1 MG: 0.1 INJECTION, SOLUTION ENDOTRACHEAL; INTRACARDIAC; INTRAVENOUS at 07:19

## 2021-01-01 RX ADMIN — ASPIRIN: 300 SUPPOSITORY RECTAL at 07:05

## 2021-01-01 ASSESSMENT — PULMONARY FUNCTION TESTS
PIF_VALUE: 25
PIF_VALUE: 24

## 2021-01-01 ASSESSMENT — ENCOUNTER SYMPTOMS: TACHYPNEA: 1

## 2021-02-28 PROBLEM — I46.9 CARDIAC ARREST (HCC): Status: ACTIVE | Noted: 2021-01-01
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
## 2021-02-28 NOTE — PLAN OF CARE
Problem: OXYGENATION/RESPIRATORY FUNCTION  Goal: Patient will maintain patent airway  2/28/2021 0929 by Nick Amaya RCP  Outcome: Ongoing     Problem: OXYGENATION/RESPIRATORY FUNCTION  Goal: Patient will achieve/maintain normal respiratory rate/effort  Description: Respiratory rate and effort will be within normal limits for the patient  2/28/2021 0929 by Nick Amaya RCP  Outcome: Ongoing  Note:   [address] SP02/ABG'S    [x]  IDENTIFY APPROPRIATE OXYGEN THERAPY  [x]   MONITOR SP02/ABG'S AS NEEDED   [x]   PATIENT EDUCATION AS NEEDED        Problem: MECHANICAL VENTILATION  Goal: Patient will maintain patent airway  2/28/2021 0929 by Nick Amaya RCP  Outcome: Ongoing  Note: MECHANICAL VENTILATION     [x]  PROVIDE OPTIMAL VENTILATION  [x]   ASSESS FOR EXTUBATION READINESS  [x]   ASSESS FOR WEANING READINESS  [x]  EXTUBATE AS TOLERATED  [x]  IMPLEMENT ADULT MECHANICAL VENTILATION PROTOCOL  [x]  MAINTAIN ADEQUATE OXYGENATION  [x]  PERFORM SPONTANEOUS WEANING TRIAL AS TOLERATED     2/28/2021 0558 by Hany San RCP  Outcome: Ongoing     Problem: MECHANICAL VENTILATION  Goal: Oral health is maintained or improved  2/28/2021 0929 by Nick Amaya RCP  Outcome: Ongoing     Problem: MECHANICAL VENTILATION  Goal: ET tube will be managed safely  2/28/2021 0929 by Nick Amaya RCP  Outcome: Ongoing     Problem: MECHANICAL VENTILATION  Goal: Ability to express needs and understand communication  2/28/2021 0929 by Nick Amaya RCP  Outcome: Ongoing     Problem: MECHANICAL VENTILATION  Goal: Mobility/activity is maintained at optimum level for patient  2/28/2021 7696 by Nick Amaya RCP  Outcome: Ongoing     Problem: SKIN INTEGRITY  Goal: Skin integrity is maintained or improved  2/28/2021 0929 by Nick Amaya RCP  Outcome: Ongoing
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
## 2021-02-28 NOTE — DEATH NOTES
Death Pronouncement Note  Patient's Name: Shakir Patel   Patient's YOB: 1935  MRN Number: [id number]    Admitting Provider: Rickie Sánchez MD  Attending Provider: Rickie Sánchez MD    Patient was examined and the following were absent: Pulses, Blood Pressure and Respiratory effort    I declared the patient dead on  02/28/21   at 1032am    Preliminary Cause of Death:     Electronically signed by Kamilah Crisostomo DO on 2/28/21 at 10:34 AM EST

## 2021-02-28 NOTE — PROCEDURES
Arterial Line Placement Procedure Note          Performed by: Shalini Roberts DO               Indication: Need for serial blood work, metabolic derangement, arterial blood gases, mechanical ventilation, severe hypotension, cardiovascular instability and shock    Consent: Unable to be obtained due to the emergent nature of this procedure. Nigel's Test: Not indicated in this particular procedure    Time out performed: Immediately prior to the procedure a \"time out\" was called to verify the correct patient, the correct procedure, equipment, support staff and site/side marked as required. All elements of maximal sterile barrier techniques were followed. Procedure: The skin over the right femoral artery was prepped with Chloraprep. Local anesthesia was not performed due to the emergent nature of this procedure. A 20 gauge arterial line catheter was then inserted, using a modified Seldinger technique, into the vessel. The transducer set was then attached and securely fastened to the skin with sutures. Waveforms on the monitor were observed and found to be adequate. The patient had good distal perfusion after the procedure. The site was then dressed in a sterile fashion. The patient tolerated the procedure well.      Complications: None

## 2021-02-28 NOTE — ED NOTES
Pulse check, PEA   CPR resumed   300mg amiodarone IVP  2g magnesium given        Vilma Rey RN  02/28/21 2577

## 2021-02-28 NOTE — ED NOTES
Pt arrived via EMS from [address]. Found unresponsive CPR started @0420 at facility. ROSC x2 3 EPI given  0.5mg atropine PTA. Pt arrived on [place] device in place  Last epi 0522.  0536 pulse check +bilat breath sounds no rhythm available at this time d/t EKG not attached. 0536 1 epi pupils 2MM non reactive. 0538 /117 epi gtt started @2.5mcg/min  ETT 7.0cm [email or web address] OG placed  0545 EKG obtained  0607 1 amp bicarb given HR rhythm change   0613 STEMI alert called   0614 1 amp bicarb given 300mg ASA rectal given  0642 Pt to CT  0644 PEA CPR started 1epi given  0646 pulse present ST   Dr. Oliver Ferguson at bedside. Pupils non reactive rt 6mm. Dr Collin Lockwood at bedside.          Tim Ann, RN  02/28/21 7276

## 2021-02-28 NOTE — CONSULTS
[facility]    Inpatient Consultation Note               Today's Date: 2/28/2021  Patient Name: Kiesha Barboza  Date of admission: 2/28/2021  5:36 AM  Patient's age: 80 y.o., 1935  Admission Dx: Cardiac arrest [facility]) [I46.9]    Reason for  Consult:  Cardiac arrest     Requesting Physician: Madeline Greco MD    CHIEF COMPLAINT:  Cardiac arrest   No chief complaint on file. History Obtained From Electronic medical record. HISTORY OF PRESENT ILLNESS:      The patient is a 80 y.o. female who is admitted to the hospital for cardiac arrest.  She initially presented with PEA arrest.  History is very limited and its mainly from the ER team and chart review. Patient coded several times prior to arrival and after arrival to the ER. The initial code was PEA arrest.  Unknown downtime prior to EMS arrival.  Initial EKG without STEMI then and a repeat EKG it showed inferior STEMI. Cardiology team and the Cath Lab was activated. Team including the interventional cardiologist Dr. Elana Howard arrived to the ER. On arrival patient was in PEA arrest. ER team was running the code. After ROSC was achieved,  Examining the patient at bedside she is off sedation not following commands and not responding to pain. Also full blown dilated right pupil. Absent corneal and pupil reflexes. Earlier CT head showed large remote right MCA distribution infarct large volume loss. Past Medical History:   has a past medical history of Anemia, Anxiety, Atrial fibrillation (HCC), Bipolar 1 disorder ([facility] [address] Colon cancer ([facility] [address] COPD (chronic obstructive pulmonary disease) ([facility] [address] Dementia ([facility] [address] Dementia ([facility] [address] Diabetes mellitus ([facility] [address] Gastric reflux, Hyperlipidemia, Hypothyroid, and Pneumonia. Past Surgical History:   has no past surgical history on file. Home Medications:    Prior to Admission medications    Medication Sig Start Date End Date Taking?  Authorizing Provider   atorvastatin (LIPITOR) 10 MG tablet tablet Historical Provider, MD   Albuterol Sulfate (ALBUTEROL 2 MG/5 ML ORAL SOLN CMPD) solution    Historical Provider, MD   warfarin (COUMADIN) 3 MG tablet tablet    Historical Provider, MD   ferrous sulfate 134 MG TABS     Historical Provider, MD   Ascorbic Acid (VITAMIN C) 100 MG CHEW     Historical Provider, MD   LORazepam (ATIVAN) 1 MG tablet Take 1 mg by mouth every 6 hours as needed for Anxiety. Historical Provider, MD   acetaminophen (TYLENOL) 325 MG tablet Take 650 mg by mouth every 6 hours as needed for Pain    Historical Provider, MD   Skin Protectants, Misc. (DIMETHICONE-ZINC OXIDE) cream Apply topically 2 times daily Apply topically to buttocks 2 times daily.     Historical Provider, MD   calcium carbonate-vitamin D3 (CALCIUM 600-D) 600-400 MG-UNIT TABS Take 1 tablet by mouth daily    Historical Provider, MD   ipratropium-albuterol (DUONEB) 0.5-2.5 (3) MG/3ML SOLN nebulizer solution Inhale 1 vial into the lungs every 4 hours as needed for Shortness of Breath    Historical Provider, MD   memantine (NAMENDA) 10 MG tablet Take 10 mg by mouth 2 times daily    Historical Provider, MD   levothyroxine (SYNTHROID) 125 MCG tablet Take 125 mcg by mouth every morning     Historical Provider, MD   docusate sodium (COLACE) 100 MG capsule Take 100 mg by mouth 2 times daily    Historical Provider, MD   donepezil (ARICEPT) 10 MG tablet Take 10 mg by mouth nightly    Historical Provider, MD   polyethylene glycol (GLYCOLAX) powder Take 17 g by mouth nightly     Historical Provider, MD   rivaroxaban (XARELTO) 20 MG TABS tablet Take 20 mg by mouth daily Indications: **Give with a meal for absorption** **Give with a meal for absorption**    Historical Provider, MD   risperiDONE (RISPERDAL) 0.25 MG tablet Take 0.25 mg by mouth daily     Historical Provider, MD   isosorbide dinitrate (ISORDIL) 20 MG tablet Take 20 mg by mouth 2 times daily Indications: 1100 and 2000 to ensure adequate \"nitrate-free period\" 1100 and 2000 to ensure adequate \"nitrate-free period\"    Historical Provider, MD   Multiple Vitamins-Minerals (THERAPEUTIC MULTIVITAMIN-MINERALS) tablet Take 1 tablet by mouth daily    Historical Provider, MD   Ascorbic Acid (VITAMIN C) 250 MG tablet Take 250 mg by mouth 2 times daily    Historical Provider, MD   aspirin 81 MG tablet Take 81 mg by mouth every morning     Historical Provider, MD        Current Facility-Administered Medications: EPINEPHrine (EPINEPHrine HCL) 5 mg in dextrose 5 % 250 mL infusion, 1 mcg/min, Intravenous, Continuous  EPINEPHrine 1 MG/10ML injection, , ,   sodium chloride flush 0.9 % injection 10 mL, 10 mL, Intravenous, 2 times per day  sodium chloride flush 0.9 % injection 10 mL, 10 mL, Intravenous, PRN  promethazine (PHENERGAN) tablet 12.5 mg, 12.5 mg, Oral, Q6H PRN **OR** ondansetron (ZOFRAN) injection 4 mg, 4 mg, Intravenous, Q6H PRN  polyethylene glycol (GLYCOLAX) packet 17 g, 17 g, Oral, Daily PRN  acetaminophen (TYLENOL) tablet 650 mg, 650 mg, Oral, Q6H PRN **OR** acetaminophen (TYLENOL) suppository 650 mg, 650 mg, Rectal, Q6H PRN  norepinephrine (LEVOPHED) 16 mg in sodium chloride 0.9 % 250 mL infusion, 2-100 mcg/min, Intravenous, Continuous  heparin (porcine) injection 4,000 Units, 4,000 Units, Intravenous, Once  heparin (porcine) injection 4,000 Units, 4,000 Units, Intravenous, PRN  heparin (porcine) injection 2,000 Units, 2,000 Units, Intravenous, PRN  heparin 25,000 units in dextrose 5% 250 mL (premix) infusion, 5-30 Units/kg/hr, Intravenous, Continuous  vasopressin 20 Units in dextrose 5 % 100 mL infusion, 0.01-0.04 Units/min, Intravenous, Continuous  phenylephrine (JASMYNE-SYNEPHRINE) 50 mg in dextrose 5 % 250 mL infusion,  mcg/min, Intravenous, Continuous  heparin (porcine) 100 UNIT/ML infusion, , ,   chlorhexidine (PERIDEX) 0.12 % solution 15 mL, 15 mL, Mouth/Throat, BID  [COMPLETED] amiodarone (CORDARONE) 150 mg in dextrose 5 % 100 mL bolus, 150 mg, Intravenous, Once **FOLLOWED BY** TROPONINT NOT REPORTED NOT REPORTED       FASTING LIPID PANEL:  Lab Results   Component Value Date    HDL 49 12/01/2017    TRIG 62 12/01/2017     LIVER PROFILE:  Recent Labs     02/28/21  0603   *   *   LABALBU 2.4*         Patient's Active Problem List  Active Problems:    Cardiac arrest [facility])  Resolved Problems:    * No resolved hospital problems. *        IMPRESSION:    1. PEA cardiac arrest  2. Inferior STEMI  3. Acute hypoxic hypercapnic respiratory failure secondary to 1  4. Hypernatremia  5. Elevated lactic acid  6. Elevated white blood cell  7. Elevated liver enzymes  8. COPD  9. History of colon cancer  10. History of paroxysmal A. fib on Xarelto  11. History of CAD . No records     RECOMMENDATIONS:    1. Patient presented initially with PEA arrest.  Her 3rd EKG in the ER showed inferior STEMI. Unknown downtime. Patient voided multiple times before and after arrival to the ER. After EKG showed inferior STEMI Cath Lab was activated. On arrival to the ER for the patient room patient was in PEA arrest and coding. Physical examination after ROSC is achieved showed full right blown pupil/absent pupillary reflex. Absent corneal reflexes. Not responding to commands or pain thus the patient was not taken to the lab  Because it was felt to be futile at this time and the ER team agreed on that. Thank you for allowing us to participate in the care of [facility]. If you have any questions or concerns, please do not hesitate to contact us. Discussed with ER and Nurse. Cathie Zapata M.D. Fellow, [address] [place] [address]        Please note that part of this chart were generated using voice recognition  dictation software. Although every effort was made to ensure the accuracy of this automated transcription, some errors in transcription may have occurred.         Attestation signed by      Attending Physician Statement:    I have discussed the care of  [facility] , including pertinent history and exam findings, with the Cardiology fellow/resident. I have seen and examined the patient and the key elements of all parts of the encounter have been performed by me. I agree with the assessment, plan and orders as documented by the fellow/resident, after I modified exam findings and plan of treatments, and the final version is my approved version of the assessment. Additional Comments: Patient was seen in ER. Patient presented from nursing home where she was I received call for evaluation for cardiac cath. Unknown downtime. EMS was called who initiated ACLS due to PEA and intubated with multiple rounds of CPR. Patient presented to ER and coded again. Repeat ECG concerning for inferior STEMI. Patient not responding neurologically and did not receive paralytics or sedation. When I arrived for evaluation, CPR was being performed. Patient had unequal pupil right 6 mm and left 3 mm and fixed. Absent corneal reflex. Patient not responding to pain. Sternal fracture from CPR noted. I believe it is futile to do any invasive procedure due to severe anoxic injury based on physical examination. Discussed this with ER attending who agrees with assessment. Recommend comfort care. No family in ER.     Irving Louie MD

## 2021-02-28 NOTE — ED NOTES
Bed: 13  Expected date:   Expected time:   Means of arrival:   Comments:  Evaristo Reyna RN  02/28/21 3142

## 2021-02-28 NOTE — ED PROVIDER NOTES
[facility] ED  Emergency Department  Faculty Attestation       I performed a history and physical examination of the patient and discussed management with the resident. I reviewed the residents note and agree with the documented findings including all diagnostic interpretations and plan of care. Any areas of disagreement are noted on the chart. I was personally present for the key portions of any procedures. I have documented in the chart those procedures where I was not present during the key portions. I have reviewed the emergency nurses triage note. I agree with the chief complaint, past medical history, past surgical history, allergies, medications, social and family history as documented unless otherwise noted below. Documentation of the HPI, Physical Exam and Medical Decision Making performed by scribrhonda is based on my personal performance of the HPI, PE and MDM. For Physician Assistant/ Nurse Practitioner cases/documentation I have personally evaluated this patient and have completed at least one if not all key elements of the E/M (history, physical exam, and MDM). Additional findings are as noted. Pertinent Comments     Primary Care Physician: Nanette Arthur, DO    ED Triage Vitals   BP Temp Temp Source Pulse Resp SpO2 Height Weight   02/28/21 0701 02/28/21 0713 02/28/21 0713 02/28/21 0544 02/28/21 0544 02/28/21 0544 02/28/21 0714 --   108/77 94.5 °F (34.7 °C) CORE 119 17 100 % 5' 4\" (1.626 m)         History/Physical: This is a 80 y.o. female who presents to the Emergency Department with complaint of cardiac arrest from [facility]. Received 3 epi and 0.5 mg of atropine PTA.   Achieved ROSC x 2, but Actively receiving compressions upon arrival.  Uncertain downtime prior to EMS arrival . Of note, resident did speak to the Brother. She has 3 children.  . We do not have any advance directives on her at this [place]. EKG Interpretation #1 @ 5:45     Interpreted by emergency department physician    Clinical Impression; Sinus tachy with pulmonary disease pattern. Nonspecific ST/T wave findings. Does not meet criteria for acut ischemia at this time. Teresa Daly  EKG Interpretation # 2 @ 6:07    Interpreted by emergency department physician    Clinical Impression: Sinus w/ 1st degree block and PVCs. ST elevations in II,III,and aVF as well as V3 ad V4 with depressions in aVL and V2. STEMI alert called    Teresa Daly    CRITICAL CARE: There was significant risk of life threatening deterioration of patient's condition requiring my direct management. Critical care time 40 minutes, excluding any documented procedures.       Teresa Daly MD  Attending Emergency Physician        Teresa Daly MD  [phone]

## 2021-02-28 NOTE — ED NOTES
Pulse check  Wide QRS, no palpable pulse   CPR resumed      Beulah Leyva, TANVIR  02/28/21 5777 Writer called [facility] to discuss delivering biliblanket today. Writer left VM as the call lasted greater than 10 minutes.

## 2021-02-28 NOTE — PROGRESS NOTES
[facility]    Inpatient Consultation Note               Today's Date: 2/28/2021  Patient Name: Ana Ghosh  Date of admission: 2/28/2021  5:36 AM  Patient's age: 80 y.o., 1935  Admission Dx: Cardiac arrest [facility]) [I46.9]    Reason for  Consult:  Cardiac arrest     Requesting Physician: Lester Navarro MD    CHIEF COMPLAINT:  Cardiac arrest   No chief complaint on file. History Obtained From Electronic medical record. HISTORY OF PRESENT ILLNESS:      The patient is a 80 y.o. female who is admitted to the hospital for cardiac arrest.  She initially presented with PEA arrest.  History is very limited and its mainly from the ER team and chart review. Patient coded several times prior to arrival and after arrival to the ER. The initial code was PEA arrest.  Unknown downtime prior to EMS arrival.  Initial EKG without STEMI then and a repeat EKG it showed inferior STEMI. Cardiology team and the Cath Lab was activated. Team including the interventional cardiologist Dr. Esther Ballesteros arrived to the ER. On arrival patient was in PEA arrest. ER team was running the code. After ROSC was achieved,  Examining the patient at bedside she is off sedation not following commands and not responding to pain. Also full blown dilated right pupil. Absent corneal and pupil reflexes. Earlier CT head showed large remote right MCA distribution infarct large volume loss. Past Medical History:   has a past medical history of Anemia, Anxiety, Atrial fibrillation (HCC), Bipolar 1 disorder ([facility] [address] Colon cancer ([facility] [address] COPD (chronic obstructive pulmonary disease) ([facility] [address] Dementia ([facility] [address] Dementia ([facility] [address] Diabetes mellitus ([facility] [address] Gastric reflux, Hyperlipidemia, Hypothyroid, and Pneumonia. Past Surgical History:   has no past surgical history on file. Home Medications:    Prior to Admission medications    Medication Sig Start Date End Date Taking?  Authorizing Provider   atorvastatin (LIPITOR) 10 MG tablet tablet Historical Provider, MD   Albuterol Sulfate (ALBUTEROL 2 MG/5 ML ORAL SOLN CMPD) solution    Historical Provider, MD   warfarin (COUMADIN) 3 MG tablet tablet    Historical Provider, MD   ferrous sulfate 134 MG TABS     Historical Provider, MD   Ascorbic Acid (VITAMIN C) 100 MG CHEW     Historical Provider, MD   LORazepam (ATIVAN) 1 MG tablet Take 1 mg by mouth every 6 hours as needed for Anxiety. Historical Provider, MD   acetaminophen (TYLENOL) 325 MG tablet Take 650 mg by mouth every 6 hours as needed for Pain    Historical Provider, MD   Skin Protectants, Misc. (DIMETHICONE-ZINC OXIDE) cream Apply topically 2 times daily Apply topically to buttocks 2 times daily.     Historical Provider, MD   calcium carbonate-vitamin D3 (CALCIUM 600-D) 600-400 MG-UNIT TABS Take 1 tablet by mouth daily    Historical Provider, MD   ipratropium-albuterol (DUONEB) 0.5-2.5 (3) MG/3ML SOLN nebulizer solution Inhale 1 vial into the lungs every 4 hours as needed for Shortness of Breath    Historical Provider, MD   memantine (NAMENDA) 10 MG tablet Take 10 mg by mouth 2 times daily    Historical Provider, MD   levothyroxine (SYNTHROID) 125 MCG tablet Take 125 mcg by mouth every morning     Historical Provider, MD   docusate sodium (COLACE) 100 MG capsule Take 100 mg by mouth 2 times daily    Historical Provider, MD   donepezil (ARICEPT) 10 MG tablet Take 10 mg by mouth nightly    Historical Provider, MD   polyethylene glycol (GLYCOLAX) powder Take 17 g by mouth nightly     Historical Provider, MD   rivaroxaban (XARELTO) 20 MG TABS tablet Take 20 mg by mouth daily Indications: **Give with a meal for absorption** **Give with a meal for absorption**    Historical Provider, MD   risperiDONE (RISPERDAL) 0.25 MG tablet Take 0.25 mg by mouth daily     Historical Provider, MD   isosorbide dinitrate (ISORDIL) 20 MG tablet Take 20 mg by mouth 2 times daily Indications: 1100 and 2000 to ensure adequate \"nitrate-free period\" 1100 and 2000 to ensure adequate \"nitrate-free period\"    Historical Provider, MD   Multiple Vitamins-Minerals (THERAPEUTIC MULTIVITAMIN-MINERALS) tablet Take 1 tablet by mouth daily    Historical Provider, MD   Ascorbic Acid (VITAMIN C) 250 MG tablet Take 250 mg by mouth 2 times daily    Historical Provider, MD   aspirin 81 MG tablet Take 81 mg by mouth every morning     Historical Provider, MD        Current Facility-Administered Medications: EPINEPHrine (EPINEPHrine HCL) 5 mg in dextrose 5 % 250 mL infusion, 1 mcg/min, Intravenous, Continuous  EPINEPHrine 1 MG/10ML injection, , ,   sodium chloride flush 0.9 % injection 10 mL, 10 mL, Intravenous, 2 times per day  sodium chloride flush 0.9 % injection 10 mL, 10 mL, Intravenous, PRN  promethazine (PHENERGAN) tablet 12.5 mg, 12.5 mg, Oral, Q6H PRN **OR** ondansetron (ZOFRAN) injection 4 mg, 4 mg, Intravenous, Q6H PRN  polyethylene glycol (GLYCOLAX) packet 17 g, 17 g, Oral, Daily PRN  acetaminophen (TYLENOL) tablet 650 mg, 650 mg, Oral, Q6H PRN **OR** acetaminophen (TYLENOL) suppository 650 mg, 650 mg, Rectal, Q6H PRN  norepinephrine (LEVOPHED) 16 mg in sodium chloride 0.9 % 250 mL infusion, 2-100 mcg/min, Intravenous, Continuous  heparin (porcine) injection 4,000 Units, 4,000 Units, Intravenous, Once  heparin (porcine) injection 4,000 Units, 4,000 Units, Intravenous, PRN  heparin (porcine) injection 2,000 Units, 2,000 Units, Intravenous, PRN  heparin 25,000 units in dextrose 5% 250 mL (premix) infusion, 5-30 Units/kg/hr, Intravenous, Continuous  vasopressin 20 Units in dextrose 5 % 100 mL infusion, 0.01-0.04 Units/min, Intravenous, Continuous  phenylephrine (JASMYNE-SYNEPHRINE) 50 mg in dextrose 5 % 250 mL infusion,  mcg/min, Intravenous, Continuous  heparin (porcine) 100 UNIT/ML infusion, , ,   chlorhexidine (PERIDEX) 0.12 % solution 15 mL, 15 mL, Mouth/Throat, BID  [COMPLETED] amiodarone (CORDARONE) 150 mg in dextrose 5 % 100 mL bolus, 150 mg, Intravenous, Once **FOLLOWED BY** amiodarone (CORDARONE) 450 mg in dextrose 5 % 250 mL infusion, 1 mg/min, Intravenous, Continuous  sodium bicarbonate 75 mEq in sodium chloride 0.45 % 1,000 mL infusion, , Intravenous, Continuous  levetiracetam (KEPPRA) 1500 mg/100 mL IVPB, 1,500 mg, Intravenous, Q12H    Allergies:  Levaquin [levofloxacin] and Penicillins    Social History:   reports that she has quit smoking. Her smoking use included cigarettes. She has a 50.00 pack-year smoking history. She has never used smokeless tobacco. She reports that she does not drink alcohol or use drugs. Family History: family history is not on file. REVIEW OF SYSTEMS:      Unable to obtain    PHYSICAL EXAM:      /78   Pulse 63   Temp 94.5 °F (34.7 °C) (Core)   Resp (!) 37   Ht 5' 4\" (1.626 m)   Wt 165 lb (74.8 kg) Comment: estimated  SpO2 (!) 65%   BMI 28.32 kg/m²      Intake/Output Summary (Last 24 hours) at 2/28/2021 1117  Last data filed at 2/28/2021 7711  Gross per 24 hour   Intake 821 ml   Output 50 ml   Net 771 ml         Constitutional and General Appearance: Intubated  Respiratory: On mechanical ventilation  Cardiovascular:  Regular rate and rhythm. No murmurs. Abdomen:   Soft  Extremities:  No lower extremity edema  Neurologic:  Not following commands  Not responding to pain  Right dilated pupil. Pupil reflex. Absent  Corneal reflex. Absent    DATA:    Diagnostics:      EKG:    Inferior STEMI      Labs:     CBC:   Recent Labs     02/28/21  0603 02/28/21  0956   WBC 16.0* 19.7*   HGB 12.4 10.5*   HCT 44.3 36.6    197     BMP:   Recent Labs     02/28/21  0603 02/28/21  0956    150*   K 4.5 4.0   CO2 17* 14*   BUN 18 20   CREATININE 0.74 0.80   LABGLOM >60 >60   GLUCOSE 308* 252*     Pro-BNP:    Recent Labs     02/28/21  0603   PROBNP 906*     Recent Labs     02/28/21  0603   PROTIME 11.4   INR 1.1     APTT:  Recent Labs     02/28/21  0603 02/28/21  0956   APTT 24.8 29.7       Recent Labs     02/28/21  0603 02/28/21  7741 TROPONINT NOT REPORTED NOT REPORTED       FASTING LIPID PANEL:  Lab Results   Component Value Date    HDL 49 12/01/2017    TRIG 62 12/01/2017     LIVER PROFILE:  Recent Labs     02/28/21  0603   *   *   LABALBU 2.4*         Patient's Active Problem List  Active Problems:    Cardiac arrest [facility])  Resolved Problems:    * No resolved hospital problems. *        IMPRESSION:    1. PEA cardiac arrest  2. Inferior STEMI  3. Acute hypoxic hypercapnic respiratory failure secondary to 1  4. Hypernatremia  5. Elevated lactic acid  6. Elevated white blood cell  7. Elevated liver enzymes  8. COPD  9. History of colon cancer  10. History of paroxysmal A. fib on Xarelto  11. History of CAD . No records     RECOMMENDATIONS:    1. Patient presented initially with PEA arrest.  Her 3rd EKG in the ER showed inferior STEMI. Unknown downtime. Patient voided multiple times before and after arrival to the ER. After EKG showed inferior STEMI Cath Lab was activated. On arrival to the ER for the patient room patient was in PEA arrest and coding. Physical examination after ROSC is achieved showed full right blown pupil/absent pupillary reflex. Absent corneal reflexes. Not responding to commands or pain thus the patient was not taken to the lab  Because it was felt to be futile at this time and the ER team agreed on that. Thank you for allowing us to participate in the care of [facility]. If you have any questions or concerns, please do not hesitate to contact us. Discussed with ER and Nurse. Lukasz Stroud M.D. Fellow, [address]        Please note that part of this chart were generated using voice recognition  dictation software. Although every effort was made to ensure the accuracy of this automated transcription, some errors in transcription may have occurred.         Attestation signed by      Attending Physician Statement:    I have discussed

## 2021-02-28 NOTE — FLOWSHEET NOTE
11:15 Tapan Hammond [employer]) called back and released body. 11:30 [address], pts brother and informed of the pts passing. Naseem Myers did not want to return to the hospital and said to contact the legal guardian for post mortem decision making.      11:40 - Leno Knutson (legal guardian) called and choose [facility] and gave RN permission to release body to them
After multiple V. Fib arrests with ROSC achieved, final arrest occurred at 1026. Pt was coded with no ROSC achieved with Dr. Devyn Hogue at bedside, pronounced TOD at [phone].      Electronically signed by Alice Basurto RN on 2/28/2021 at 12:00 PM
Assessment: Pt arrived via EMS from [address]. Found unresponsive CPR started @0420 at facility. Pt arrived on Yonatan Loreta device in place  Last epi 0522.  are bedside. Intervention:  responded to nurse call. Pt began to respond to CPR.  was at bedside offering compassionate care.  contacted brother with doctor for medical update. Brother will call hospital back with phone numbers of children, currently no children's phone numbers available.      Outcome: Chaplains are available 24/7 [employer].      02/28/21 1591   Encounter Summary   Services provided to: Patient   Referral/Consult From: Multi-disciplinary team   Continue Visiting   (2/27/21)   Complexity of Encounter High   Length of Encounter 45 minutes   Crisis   Type Code   Assessment Approachable   Intervention Sustaining presence/ Ministry of presence   Outcome Comfort
Message left on voicemail to pt's legal guardian Roxy Bamberger.
Name: Lyle Angeles            Brother: Herber Parikh [address], [address] [facility] [phone]    Relationship: [address] Address:   [place]:  [place]:  Zip code:    Phone Number: [phone]    ANY FOLLOW-UP NEEDED? No    IF SO, WHAT?   None   Electronically signed by Nely Painter, on 2/28/2021 at 12:13 PM.  [facility]  [phone]
Services provided to: Patient; Family   Referral/Consult From: Nurse;Multi-disciplinary team   Support System Children;Family members   Continue Visiting   (2/28/2021)   Complexity of Encounter High   Length of Encounter 2 hours   Spiritual Assessment Completed Yes   Crisis   Type Code   Assessment Approachable;Grieving   Intervention Active listening;Prayer;Scripture;Sustaining presence/ Ministry of presence   Outcome Expressed gratitude   , on 2/28/2021 at 10:04 AM.  [address]  [phone]

## 2021-02-28 NOTE — ED NOTES
Pt. HR and BP labile  Dr. Nikki Sims notified  Genet Garnica and Ismael Mccormick RT at bedside      Vilma Rey RN  02/28/21 0082

## 2021-02-28 NOTE — ED NOTES
Pt. Goes into Vfib, no pulse   CPR started  Dr. Farzana Newell at bedside      [facility]  02/28/21 6553

## 2021-02-28 NOTE — PROCEDURES
Central Line Placement Procedure Note    Performed by: Daniel Gannon DO    Indication: vascular access, poor peripheral access, hypovolemia and centrally administered medications    Consent: Unable to be obtained due to the emergent nature of this procedure. Time out performed: Immediately prior to the procedure a \"time out\" was called to verify the correct patient, the correct procedure, equipment, support staff and site/side marked as required. All elements of maximal sterile barrier techniques were followed. Procedure: The patient was positioned appropriately and the skin over the right femoral vein was prepped with Chloraprep. Local anesthesia was not performed due to the emergent nature of this procedure. A large bore needle was used to identify the vein. A guide wire was then inserted into the vein through the needle. A triple lumen catheter was then inserted into the vessel over the guide wire using the Seldinger technique. All ports showed good, free flowing blood return and were flushed with saline solution. The catheter was then securely fastened to the skin with sutures and covered with a sterile dressing. A post procedure X-ray was not indicated. The patient tolerated the procedure well.     Complications: None

## 2021-02-28 NOTE — ED NOTES
Wide QRS, no palpable pulse present  No spontaneous respirations noted          Puma Allen, TANVIR  02/28/21 2641
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
## 2021-03-01 LAB
EKG ATRIAL RATE: 108 BPM
EKG ATRIAL RATE: 61 BPM
EKG P AXIS: 84 DEGREES
EKG P AXIS: 87 DEGREES
EKG P-R INTERVAL: 196 MS
EKG P-R INTERVAL: 364 MS
EKG Q-T INTERVAL: 326 MS
EKG Q-T INTERVAL: 432 MS
EKG QRS DURATION: 70 MS
EKG QRS DURATION: 92 MS
EKG QTC CALCULATION (BAZETT): 434 MS
EKG QTC CALCULATION (BAZETT): 436 MS
EKG R AXIS: -24 DEGREES
EKG R AXIS: -49 DEGREES
EKG T AXIS: 105 DEGREES
EKG T AXIS: 106 DEGREES
EKG VENTRICULAR RATE: 108 BPM
EKG VENTRICULAR RATE: 61 BPM
MRSA, DNA, NASAL: NORMAL
SPECIMEN DESCRIPTION: NORMAL

## 2021-03-01 NOTE — CONSULTS
[address] [facility] Ethics Consultation Form  Report to [address] [place] [address]    MR# [id number]   Ethics Committee Representatives: Robin Abrams MD  Date of Consult: 3/1/2021   Date of Admission: 2/28/2021     Referral Source (doctor, nurse, etc.)  Unit MICU  [facility]AN[facility]OR[facility]    Medical Background: The patient is a 80 y.o. female who has a past medical history of Anemia, Anxiety, Atrial fibrillation ([address] Bipolar 1 disorder ([address] Colon cancer ([address] COPD (chronic obstructive pulmonary disease) ([address] Dementia ([address] Dementia ([address] Diabetes mellitus ([address] Gastric reflux, Hyperlipidemia, Hypothyroid, and Pneumonia. . Admitted to hospital2/28/2021. Cardiac arrest [facility]) [I46.9] was the admission diagnosis. Patient arrested several times in ED then moved to MICU where she arrested again. What are the requester's ethical concerns? What are his/her recommendations? What decisions need to be made? How soon do they need to be made? The concern is legal surrogacy and disproportionately burdensome to beneficial treatment. Patient and family wishes, values and goals: The brother's number is in the chart. He has 3 children whose numbers we do not have. She has no advanced directives. Process (conversation with patient, family, doctor, nurse, care coordinator, ethics committee member, pastoral care, consultants, risk, others):  I reviewed the chart and spoke with the bedside nurse to understand the clinical situation and the questions and concerns they have. Recommendations:  Check with the brother as he searches for children's phone numbers.   Thank you for the opportunity to serve the patient, family and medical team.

## 2021-03-03 ENCOUNTER — TELEPHONE (OUTPATIENT)
Dept: PULMONOLOGY | Age: 86
End: 2021-03-03